# Patient Record
Sex: FEMALE | Race: WHITE | NOT HISPANIC OR LATINO | URBAN - METROPOLITAN AREA
[De-identification: names, ages, dates, MRNs, and addresses within clinical notes are randomized per-mention and may not be internally consistent; named-entity substitution may affect disease eponyms.]

---

## 2023-10-25 ENCOUNTER — APPOINTMENT (OUTPATIENT)
Dept: RADIOLOGY | Facility: HOSPITAL | Age: 63
End: 2023-10-25
Payer: COMMERCIAL

## 2023-10-27 ENCOUNTER — HOSPITAL ENCOUNTER (OUTPATIENT)
Dept: RADIOLOGY | Facility: HOSPITAL | Age: 63
Discharge: HOME | End: 2023-10-27
Payer: COMMERCIAL

## 2023-10-27 ENCOUNTER — ANCILLARY PROCEDURE (OUTPATIENT)
Dept: PREADMISSION TESTING | Facility: HOSPITAL | Age: 63
End: 2023-10-27
Payer: COMMERCIAL

## 2023-10-27 ENCOUNTER — LAB (OUTPATIENT)
Dept: LAB | Facility: LAB | Age: 63
End: 2023-10-27
Payer: COMMERCIAL

## 2023-10-27 VITALS
HEIGHT: 66 IN | SYSTOLIC BLOOD PRESSURE: 142 MMHG | OXYGEN SATURATION: 98 % | RESPIRATION RATE: 16 BRPM | WEIGHT: 144.62 LBS | BODY MASS INDEX: 23.24 KG/M2 | DIASTOLIC BLOOD PRESSURE: 89 MMHG | HEART RATE: 86 BPM | TEMPERATURE: 98.4 F

## 2023-10-27 DIAGNOSIS — M16.0 BILATERAL PRIMARY OSTEOARTHRITIS OF HIP: ICD-10-CM

## 2023-10-27 DIAGNOSIS — M16.0 PRIMARY OSTEOARTHRITIS OF BOTH HIPS: ICD-10-CM

## 2023-10-27 DIAGNOSIS — Z01.818 PRE-OP TESTING: Primary | ICD-10-CM

## 2023-10-27 DIAGNOSIS — Z01.818 ENCOUNTER FOR PREADMISSION TESTING: ICD-10-CM

## 2023-10-27 PROBLEM — M99.03 LUMBOSACRAL DYSFUNCTION: Status: ACTIVE | Noted: 2023-10-27

## 2023-10-27 PROBLEM — R31.9 HEMATURIA: Status: ACTIVE | Noted: 2023-10-27

## 2023-10-27 PROBLEM — M54.14 THORACIC RADICULITIS: Status: ACTIVE | Noted: 2023-10-27

## 2023-10-27 PROBLEM — G89.29 CHRONIC MUSCULOSKELETAL PAIN: Status: ACTIVE | Noted: 2023-10-27

## 2023-10-27 PROBLEM — M12.9 ARTHROPATHY: Status: ACTIVE | Noted: 2023-10-27

## 2023-10-27 PROBLEM — M70.62 TROCHANTERIC BURSITIS OF BOTH HIPS: Status: ACTIVE | Noted: 2023-10-27

## 2023-10-27 PROBLEM — R30.0 DYSURIA: Status: ACTIVE | Noted: 2023-10-27

## 2023-10-27 PROBLEM — R42 DIZZINESS, NONSPECIFIC: Status: ACTIVE | Noted: 2023-10-27

## 2023-10-27 PROBLEM — M15.9 OSTEOARTHRITIS, MULTIPLE SITES: Status: ACTIVE | Noted: 2023-10-27

## 2023-10-27 PROBLEM — M47.812 CERVICAL SPONDYLOSIS WITHOUT MYELOPATHY: Status: ACTIVE | Noted: 2023-10-27

## 2023-10-27 PROBLEM — R35.0 URINARY FREQUENCY: Status: ACTIVE | Noted: 2023-10-27

## 2023-10-27 PROBLEM — K29.50 GASTRITIS, CHRONIC: Status: ACTIVE | Noted: 2023-10-27

## 2023-10-27 PROBLEM — N39.46 MIXED INCONTINENCE URGE AND STRESS: Status: ACTIVE | Noted: 2023-10-27

## 2023-10-27 PROBLEM — M99.02 SEGMENTAL AND SOMATIC DYSFUNCTION OF THORACIC REGION: Status: ACTIVE | Noted: 2023-10-27

## 2023-10-27 PROBLEM — M99.01 CERVICOTHORACIC SOMATIC DYSFUNCTION: Status: ACTIVE | Noted: 2023-10-27

## 2023-10-27 PROBLEM — M54.81 BILATERAL OCCIPITAL NEURALGIA: Status: ACTIVE | Noted: 2023-10-27

## 2023-10-27 PROBLEM — M54.12 CERVICAL RADICULOPATHY AT C7: Status: ACTIVE | Noted: 2023-10-27

## 2023-10-27 PROBLEM — M70.61 TROCHANTERIC BURSITIS OF BOTH HIPS: Status: ACTIVE | Noted: 2023-10-27

## 2023-10-27 PROBLEM — M79.18 CHRONIC MUSCULOSKELETAL PAIN: Status: ACTIVE | Noted: 2023-10-27

## 2023-10-27 LAB
ALBUMIN SERPL BCP-MCNC: 4.3 G/DL (ref 3.4–5)
ANION GAP SERPL CALC-SCNC: 11 MMOL/L (ref 10–20)
BUN SERPL-MCNC: 9 MG/DL (ref 6–23)
CALCIUM SERPL-MCNC: 9.6 MG/DL (ref 8.6–10.3)
CHLORIDE SERPL-SCNC: 103 MMOL/L (ref 98–107)
CO2 SERPL-SCNC: 30 MMOL/L (ref 21–32)
CREAT SERPL-MCNC: 0.63 MG/DL (ref 0.5–1.05)
ERYTHROCYTE [DISTWIDTH] IN BLOOD BY AUTOMATED COUNT: 12.1 % (ref 11.5–14.5)
GFR SERPL CREATININE-BSD FRML MDRD: >90 ML/MIN/1.73M*2
GLUCOSE SERPL-MCNC: 75 MG/DL (ref 74–99)
HCT VFR BLD AUTO: 47.4 % (ref 36–46)
HGB BLD-MCNC: 15.1 G/DL (ref 12–16)
MCH RBC QN AUTO: 30.4 PG (ref 26–34)
MCHC RBC AUTO-ENTMCNC: 31.9 G/DL (ref 32–36)
MCV RBC AUTO: 96 FL (ref 80–100)
NRBC BLD-RTO: 0 /100 WBCS (ref 0–0)
PLATELET # BLD AUTO: 296 X10*3/UL (ref 150–450)
PMV BLD AUTO: 10 FL (ref 7.5–11.5)
POTASSIUM SERPL-SCNC: 4.5 MMOL/L (ref 3.5–5.3)
RBC # BLD AUTO: 4.96 X10*6/UL (ref 4–5.2)
SODIUM SERPL-SCNC: 139 MMOL/L (ref 136–145)
WBC # BLD AUTO: 6.3 X10*3/UL (ref 4.4–11.3)

## 2023-10-27 PROCEDURE — 87081 CULTURE SCREEN ONLY: CPT | Mod: STJLAB

## 2023-10-27 PROCEDURE — 73700 CT LOWER EXTREMITY W/O DYE: CPT | Mod: LT

## 2023-10-27 PROCEDURE — 93005 ELECTROCARDIOGRAM TRACING: CPT

## 2023-10-27 PROCEDURE — 85027 COMPLETE CBC AUTOMATED: CPT

## 2023-10-27 PROCEDURE — 36415 COLL VENOUS BLD VENIPUNCTURE: CPT

## 2023-10-27 PROCEDURE — 80048 BASIC METABOLIC PNL TOTAL CA: CPT

## 2023-10-27 PROCEDURE — 99202 OFFICE O/P NEW SF 15 MIN: CPT | Performed by: NURSE PRACTITIONER

## 2023-10-27 PROCEDURE — 82040 ASSAY OF SERUM ALBUMIN: CPT

## 2023-10-27 RX ORDER — CHLORHEXIDINE GLUCONATE ORAL RINSE 1.2 MG/ML
SOLUTION DENTAL
COMMUNITY
Start: 2023-05-04 | End: 2023-10-27 | Stop reason: ENTERED-IN-ERROR

## 2023-10-27 RX ORDER — METOPROLOL SUCCINATE 25 MG/1
1 TABLET, EXTENDED RELEASE ORAL DAILY
COMMUNITY
Start: 2021-09-28 | End: 2023-10-27 | Stop reason: ENTERED-IN-ERROR

## 2023-10-27 RX ORDER — CHOLECALCIFEROL (VITAMIN D3) 125 MCG
1 CAPSULE ORAL DAILY
COMMUNITY
End: 2023-10-27 | Stop reason: ENTERED-IN-ERROR

## 2023-10-27 RX ORDER — OMEPRAZOLE 20 MG/1
20 TABLET, DELAYED RELEASE ORAL 2 TIMES DAILY
COMMUNITY
Start: 2020-05-08 | End: 2023-10-27 | Stop reason: ENTERED-IN-ERROR

## 2023-10-27 RX ORDER — HYDROGEN PEROXIDE 3 %
SOLUTION, NON-ORAL MISCELLANEOUS
COMMUNITY
Start: 2020-09-09 | End: 2023-10-27 | Stop reason: ENTERED-IN-ERROR

## 2023-10-27 RX ORDER — CHOLECALCIFEROL (VITAMIN D3) 50 MCG
50 TABLET ORAL DAILY
COMMUNITY

## 2023-10-27 RX ORDER — DEXAMETHASONE 0.5 MG/5ML
ELIXIR ORAL
COMMUNITY
Start: 2023-10-21 | End: 2023-10-27 | Stop reason: ENTERED-IN-ERROR

## 2023-10-27 RX ORDER — ZINC GLUCONATE 50 MG
TABLET ORAL DAILY
COMMUNITY

## 2023-10-27 RX ORDER — MIDODRINE HYDROCHLORIDE 2.5 MG/1
1 TABLET ORAL 2 TIMES DAILY
COMMUNITY
Start: 2022-04-19 | End: 2023-10-27 | Stop reason: ENTERED-IN-ERROR

## 2023-10-27 RX ORDER — CHLORHEXIDINE GLUCONATE ORAL RINSE 1.2 MG/ML
SOLUTION DENTAL
Qty: 473 ML | Refills: 0 | Status: SHIPPED | OUTPATIENT
Start: 2023-10-27 | End: 2023-11-11 | Stop reason: HOSPADM

## 2023-10-27 RX ORDER — CLOBETASOL PROPIONATE 0.05 MG/G
GEL TOPICAL
COMMUNITY
Start: 2023-10-25 | End: 2023-10-27 | Stop reason: ENTERED-IN-ERROR

## 2023-10-27 RX ORDER — EFINACONAZOLE 100 MG/ML
SOLUTION TOPICAL
COMMUNITY
Start: 2023-07-20 | End: 2023-10-27 | Stop reason: ENTERED-IN-ERROR

## 2023-10-27 RX ORDER — MULTIVIT-MIN/IRON FUM/FOLIC AC 7.5 MG-4
1 TABLET ORAL DAILY
COMMUNITY

## 2023-10-27 RX ORDER — MAGNESIUM 200 MG
TABLET ORAL
COMMUNITY
Start: 2020-08-28 | End: 2023-10-27 | Stop reason: ENTERED-IN-ERROR

## 2023-10-27 ASSESSMENT — CHADS2 SCORE
HYPERTENSION: NO
AGE GREATER THAN OR EQUAL TO 75: NO
PRIOR STROKE OR TIA OR THROMBOEMBOLISM: NO
AGE GREATER THAN OR EQUAL TO 75: NO
CHADS2 SCORE: 0
CHF: NO
DIABETES: NO

## 2023-10-27 ASSESSMENT — DUKE ACTIVITY SCORE INDEX (DASI)
CAN YOU WALK A BLOCK OR TWO ON LEVEL GROUND: YES
CAN YOU DO HEAVY WORK AROUND THE HOUSE LIKE SCRUBBING FLOORS OR LIFTING AND MOVING HEAVY FURNITURE: NO
CAN YOU TAKE CARE OF YOURSELF (EAT, DRESS, BATHE, OR USE TOILET): YES
CAN YOU DO MODERATE WORK AROUND THE HOUSE LIKE VACUUMING, SWEEPING FLOORS OR CARRYING GROCERIES: YES
CAN YOU CLIMB A FLIGHT OF STAIRS OR WALK UP A HILL: YES
DASI METS SCORE: 7
CAN YOU DO LIGHT WORK AROUND THE HOUSE LIKE DUSTING OR WASHING DISHES: YES
CAN YOU DO YARD WORK LIKE RAKING LEAVES, WEEDING OR PUSHING A MOWER: NO
CAN YOU HAVE SEXUAL RELATIONS: NO
CAN YOU RUN A SHORT DISTANCE: YES
TOTAL_SCORE: 34.45
CAN YOU WALK INDOORS, SUCH AS AROUND YOUR HOUSE: YES
CAN YOU PARTICIPATE IN STRENOUS SPORTS LIKE SWIMMING, SINGLES TENNIS, FOOTBALL, BASKETBALL, OR SKIING: YES
CAN YOU PARTICIPATE IN MODERATE RECREATIONAL ACTIVITIES LIKE GOLF, BOWLING, DANCING, DOUBLES TENNIS OR THROWING A BASEBALL OR FOOTBALL: NO

## 2023-10-27 ASSESSMENT — LIFESTYLE VARIABLES: SMOKING_STATUS: NONSMOKER

## 2023-10-27 ASSESSMENT — ACTIVITIES OF DAILY LIVING (ADL): ADL_SCORE: 0

## 2023-10-27 NOTE — PREPROCEDURE INSTRUCTIONS
Medication List            Accurate as of October 27, 2023  9:34 AM. Always use your most recent med list.                cholecalciferol 50 MCG (2000 UT) tablet  Commonly known as: Vitamin D-3  Medication Adjustments for Surgery: Stop 7 days before surgery     multivitamin with minerals tablet  Medication Adjustments for Surgery: Stop 7 days before surgery     PROBIOTIC ORAL  Medication Adjustments for Surgery: Stop 7 days before surgery     zinc gluconate 50 mg tablet  Medication Adjustments for Surgery: Stop 7 days before surgery              PRE-OPERATIVE INSTRUCTIONS    You will receive notification one business day prior to your surgery to confirm your arrival time and additional information. It is important that you answer your phone and/or check your messages during this time.    Please enter the building through the Outpatient entrance. Take the elevator off the lobby to the 2nd floor and check in at the Outpatient Surgery desk    INSTRUCTIONS:  Talk to your surgeon for instructions if you should stop your aspirin, blood thinner, or diabetes medicines.  DO NOT take any multivitamins or over the counter supplements for 7-10 days before surgery.  If not being admitted, you must have an adult immediately available to drive you home after surgery. We also highly recommend you have someone stay with you for the entire day and night of your surgery.  For children having surgery, a parent or legal guardian must accompany t hem to the surgery center. If this is not possible, please call [phone] to make additional arrangements.  For adults who are unable to consent or make medical decisions for themselves, a legal guardian or Power of  must accompany them to the surgery center. If this is not possible, please call [phone] to make additional arrangements.  Wear comfortable, loose fitting clothing.  All jewelry and piercings must be removed. If you are unable to remove an item or have a dermal  piercing, please be sure to tell the nurse when you arrive for surgery.  Nail polish and make-up must be removed.  Avoid smoking or consuming alcohol for 24 hours before surgery.  To help prevent infection, please take a shower/bath and wash your hair the night before and/or morning of surgery.    Additional instructions about eating and drinking before surgery:  Do not eat any solid foods or drink anything but clear liquids within 6 hours of your arrival time for surgery. Milk, nutritional drinks/supplements, and infant formula are considered solid foods.  You may drink up to 12 oz. of clear liquids up to 2 hours before your arrival time for surgery, unless directed otherwise by your surgeon. Clear liquids include water, non-carbonated sports drinks (Gatorade), black tea or coffee (no creamers) and breast milk.    If you received a blue folder, please review additional information provided inside the folder regarding additional preparation.     If you have any questions or concerns, please call Pre-Admission Testing at [phone].

## 2023-10-27 NOTE — CPM/PAT H&P
"CPM/PAT Evaluation       Name: Rachel Westbrook (Rachel Westbrook)  /Age: 1960/63 y.o.     In-Person       Chief Complaint: left hip pains     HPI    VS is a 64 yo female who has been having left hip pains for quite some time and pain is worsening- \"left thigh is sore, but not excruciating\". Imaging shows left hip OA and subsequently she is scheduled for left hip replacement. Denies n/t down leg. Skin intact. Had a recent steroid injection in right shoulder about 3 months ago. Otherwise no recent illness, chest pains or shortness of breath.     Past Medical History:   Diagnosis Date    Arthritis     Mclaughlin's esophagus     DDD (degenerative disc disease), cervical     Fatty liver     Fibromyalgia, primary     GERD (gastroesophageal reflux disease)     follows with GI    Hyperlipidemia     Hypertension     Personal history of tuberculosis     History of tuberculosis    PONV (postoperative nausea and vomiting)      PCP: Dr. Melendez  GI: Dr. Bonilla    Past Surgical History:   Procedure Laterality Date    COLONOSCOPY      OOPHORECTOMY Left     UPPER GASTROINTESTINAL ENDOSCOPY         Patient  has no history on file for sexual activity.    Family History   Problem Relation Name Age of Onset    Other (alcoholic liver cirrhosis) Father      Other (alcoholic liver cirrhosis) Brother         No Known Allergies    Prior to Admission medications    Medication Sig Start Date End Date Taking? Authorizing Provider   BACILLUS COAGULANS-INULIN ORAL Take 1 capsule by mouth once daily.    Historical Provider, MD   chlorhexidine (Peridex) 0.12 % solution TAKE 15mL BY MOUTH TWICE A DAY.  SWISH AND SPIT  USE LIKE MOUTHWASH 23   Historical Provider, MD   cholecalciferol (Vitamin D-3) 125 MCG (5000 UT) capsule Take 1 capsule (5,000 Units) by mouth once daily.    Historical Provider, MD   clobetasol (Temovate) 0.05 % gel dry affected area with tissue paper  apply to affected area 2-4 times a day up to 5 days per week. avoid " eating or drinking for at least 30 10/25/23   Historical Provider, MD   cyanocobalamin, vitamin B-12, 1,000 mcg tablet, sublingual DISSOLVE ONE TABLET UNDER TONGUE EVERY DAY 8/28/20   Historical Provider, MD   dexAMETHasone 0.5 mg/5 mL elixir TAKE 5 ML BY MOUTH THREE TIMES A DAY AS NEEDED 10/21/23   Historical Provider, MD   diclofenac sodium 1 % kit Apply 2 (TWO) grams of gel TO THE hands(S) THREE TIMES DAILY.Do not apply more than EIGHT grams per day to any one joint. 10/30/20   Historical Provider, MD   esomeprazole (NexIUM) 20 mg DR capsule TAKE ONE CAPSULE BY MOUTH TWICE A DAY BEFORE MEALS 9/9/20   Historical Provider, MD   Jublia 10 % solution with applicator APPLY A THIN LAYER (0.5ML) TO AFFECTED TOENAILS AT BEDTIME  FILE DOWN WEEKLY. 7/20/23   Historical Provider, MD   Lactobacillus acidophilus (PROBIOTIC ORAL) Take 1 capsule by mouth once daily.    Historical Provider, MD   metoprolol succinate XL (Toprol-XL) 25 mg 24 hr tablet Take 1 tablet (25 mg) by mouth once daily. 9/28/21   Historical Provider, MD   midodrine (Proamatine) 2.5 mg tablet Take 1 tablet (2.5 mg) by mouth 2 times a day. 4/19/22   Historical Provider, MD   omeprazole OTC (PriLOSEC OTC) 20 mg EC tablet Take 1 tablet (20 mg) by mouth twice a day. 5/8/20   Historical Provider, MD UNA SCHWAB   Constitutional: Negative for fever, chills, or sweats   ENMT: Negative for nasal discharge, congestion, ear pain, mouth pain, throat pain eyeglasses  Respiratory: Negative for cough, wheezing, shortness of breath   Cardiac: Negative for chest pain, dyspnea on exertion, palpitations   Gastrointestinal: Negative for nausea, vomiting, diarrhea, constipation, abdominal pain  Genitourinary: Negative for dysuria, flank pain, frequency, hematuria   Musculoskeletal: Positive for decreased ROM, pain, weakness in left hip                                 And thigh  Neurological: Negative for dizziness, confusion, headache  Psychiatric: Negative for mood changes    Skin: Negative for itching, rash, ulcer    Hematologic/Lymph: Negative for bruising, easy bleeding  Allergic/Immunologic: itching, sneezing, swelling       Physical Exam  HENT:      Head: Normocephalic.      Mouth/Throat:      Mouth: Mucous membranes are moist.   Eyes:      Extraocular Movements: Extraocular movements intact.   Cardiovascular:      Rate and Rhythm: Normal rate and regular rhythm.   Pulmonary:      Effort: Pulmonary effort is normal.      Breath sounds: Normal breath sounds.   Abdominal:      General: Abdomen is flat.      Palpations: Abdomen is soft.   Musculoskeletal:         General: Tenderness present.      Cervical back: Normal range of motion.      Comments: Left hip limited ROM   Skin:     General: Skin is warm and dry.   Neurological:      General: No focal deficit present.      Mental Status: She is alert.   Psychiatric:         Mood and Affect: Mood normal.          PAT AIRWAY:   Airway:     Neck ROM::  Full  normal      Anesthesia:  Patient has history of PONV with anesthesia.    Visit Vitals  /89   Pulse 86   Temp 36.9 °C (98.4 °F) (Temporal)   Resp 16       DASI Risk Score      Flowsheet Row Most Recent Value   DASI SCORE 34.45   METS Score (Will be calculated only when all the questions are answered) 7          Caprini DVT Assessment      Flowsheet Row Most Recent Value   DVT Score 1   Current Status Major surgery planned, including arthroscopic and laproscopic (1-2 hours)   Age 60-75 years   BMI 30 or less          Modified Frailty Index      Flowsheet Row Most Recent Value   Modified Frailty Index Calculator 0          CHADS2 Stroke Risk  Current as of 2 minutes ago        N/A 3 - 100%: High Risk   2 - 3%: Medium Risk   0 - 2%: Low Risk     Last Change: N/A          This score determines the patient's risk of having a stroke if the patient has atrial fibrillation.        This score is not applicable to this patient. Components are not calculated.          Revised Cardiac Risk  Index      Flowsheet Row Most Recent Value   Revised Cardiac Risk Calculator 0          Apfel Simplified Score      Flowsheet Row Most Recent Value   Apfel Simplified Score Calculator 2          Risk Analysis Index Results This Encounter         10/27/2023  0920             ATKINSON Cancer History: Patient does not indicate history of cancer    Total Risk Analysis Index Score Without Cancer: 20    Total Risk Analysis Index Score: 20          Stop Bang Score      Flowsheet Row Most Recent Value   Do you snore loudly? 0   Do you often feel tired or fatigued after your sleep? 0   Has anyone ever observed you stop breathing in your sleep? 0   Do you have or are you being treated for high blood pressure? 0   Recent BMI (Calculated) 22.7   Is BMI greater than 35 kg/m2? 0=No   Age older than 50 years old? 1=Yes   Is your neck circumference greater than 17 inches (Male) or 16 inches (Female)? 0   Gender - Male 0=No   STOP-BANG Total Score 1            Assessment and Plan:     63-year-old female scheduled for Mamadou navigated arthroplasty-left hip on 11/10/2023 with Dr. Joe.  Blood work and MRSA ordered-oral rinse prescribed.  EKG shows normal sinus rhythm with ventricular rate of 81 bpm. She is instructed to notify surgeon office if any new skin changes occur to surgical limb. No further orders indicated. Of note, GI gave their clearance with instruction on aspirin for post op DVT prophylaxis-see note in media.     See risk scores as previously documented.

## 2023-10-28 LAB
ATRIAL RATE: 81 BPM
P AXIS: 59 DEGREES
P OFFSET: 216 MS
P ONSET: 169 MS
PR INTERVAL: 116 MS
Q ONSET: 227 MS
QRS COUNT: 13 BEATS
QRS DURATION: 78 MS
QT INTERVAL: 378 MS
QTC CALCULATION(BAZETT): 439 MS
QTC FREDERICIA: 417 MS
R AXIS: 9 DEGREES
T AXIS: 59 DEGREES
T OFFSET: 416 MS
VENTRICULAR RATE: 81 BPM

## 2023-10-29 LAB — STAPHYLOCOCCUS SPEC CULT: NORMAL

## 2023-11-10 ENCOUNTER — APPOINTMENT (OUTPATIENT)
Dept: RADIOLOGY | Facility: HOSPITAL | Age: 63
End: 2023-11-10
Payer: COMMERCIAL

## 2023-11-10 ENCOUNTER — HOSPITAL ENCOUNTER (OUTPATIENT)
Facility: HOSPITAL | Age: 63
Discharge: HOME HEALTH | End: 2023-11-11
Attending: STUDENT IN AN ORGANIZED HEALTH CARE EDUCATION/TRAINING PROGRAM | Admitting: STUDENT IN AN ORGANIZED HEALTH CARE EDUCATION/TRAINING PROGRAM
Payer: COMMERCIAL

## 2023-11-10 ENCOUNTER — ANESTHESIA (OUTPATIENT)
Dept: OPERATING ROOM | Facility: HOSPITAL | Age: 63
End: 2023-11-10
Payer: COMMERCIAL

## 2023-11-10 ENCOUNTER — PREP FOR PROCEDURE (OUTPATIENT)
Dept: ORTHOPEDICS | Facility: HOSPITAL | Age: 63
End: 2023-11-10

## 2023-11-10 ENCOUNTER — ANESTHESIA EVENT (OUTPATIENT)
Dept: OPERATING ROOM | Facility: HOSPITAL | Age: 63
End: 2023-11-10
Payer: COMMERCIAL

## 2023-11-10 DIAGNOSIS — Z96.642 S/P TOTAL LEFT HIP ARTHROPLASTY: Primary | ICD-10-CM

## 2023-11-10 PROBLEM — M16.9 HIP OSTEOARTHRITIS: Status: ACTIVE | Noted: 2023-11-10

## 2023-11-10 PROCEDURE — 7100000009 HC PHASE TWO TIME - INITIAL BASE CHARGE: Performed by: STUDENT IN AN ORGANIZED HEALTH CARE EDUCATION/TRAINING PROGRAM

## 2023-11-10 PROCEDURE — 2500000004 HC RX 250 GENERAL PHARMACY W/ HCPCS (ALT 636 FOR OP/ED): Performed by: STUDENT IN AN ORGANIZED HEALTH CARE EDUCATION/TRAINING PROGRAM

## 2023-11-10 PROCEDURE — 2500000004 HC RX 250 GENERAL PHARMACY W/ HCPCS (ALT 636 FOR OP/ED): Performed by: ANESTHESIOLOGIST ASSISTANT

## 2023-11-10 PROCEDURE — C1713 ANCHOR/SCREW BN/BN,TIS/BN: HCPCS | Performed by: STUDENT IN AN ORGANIZED HEALTH CARE EDUCATION/TRAINING PROGRAM

## 2023-11-10 PROCEDURE — 2500000005 HC RX 250 GENERAL PHARMACY W/O HCPCS: Performed by: ANESTHESIOLOGIST ASSISTANT

## 2023-11-10 PROCEDURE — 3700000002 HC GENERAL ANESTHESIA TIME - EACH INCREMENTAL 1 MINUTE: Performed by: STUDENT IN AN ORGANIZED HEALTH CARE EDUCATION/TRAINING PROGRAM

## 2023-11-10 PROCEDURE — 96372 THER/PROPH/DIAG INJ SC/IM: CPT | Performed by: STUDENT IN AN ORGANIZED HEALTH CARE EDUCATION/TRAINING PROGRAM

## 2023-11-10 PROCEDURE — A27130 PR TOTAL HIP ARTHROPLASTY: Performed by: ANESTHESIOLOGIST ASSISTANT

## 2023-11-10 PROCEDURE — 7100000001 HC RECOVERY ROOM TIME - INITIAL BASE CHARGE: Performed by: STUDENT IN AN ORGANIZED HEALTH CARE EDUCATION/TRAINING PROGRAM

## 2023-11-10 PROCEDURE — 3600000017 HC OR TIME - EACH INCREMENTAL 1 MINUTE - PROCEDURE LEVEL SIX: Performed by: STUDENT IN AN ORGANIZED HEALTH CARE EDUCATION/TRAINING PROGRAM

## 2023-11-10 PROCEDURE — 2500000004 HC RX 250 GENERAL PHARMACY W/ HCPCS (ALT 636 FOR OP/ED): Performed by: ANESTHESIOLOGY

## 2023-11-10 PROCEDURE — 2780000003 HC OR 278 NO HCPCS: Performed by: STUDENT IN AN ORGANIZED HEALTH CARE EDUCATION/TRAINING PROGRAM

## 2023-11-10 PROCEDURE — 2500000005 HC RX 250 GENERAL PHARMACY W/O HCPCS: Performed by: STUDENT IN AN ORGANIZED HEALTH CARE EDUCATION/TRAINING PROGRAM

## 2023-11-10 PROCEDURE — 7100000011 HC EXTENDED STAY RECOVERY HOURLY - NURSING UNIT

## 2023-11-10 PROCEDURE — 2720000007 HC OR 272 NO HCPCS: Performed by: STUDENT IN AN ORGANIZED HEALTH CARE EDUCATION/TRAINING PROGRAM

## 2023-11-10 PROCEDURE — 3600000018 HC OR TIME - INITIAL BASE CHARGE - PROCEDURE LEVEL SIX: Performed by: STUDENT IN AN ORGANIZED HEALTH CARE EDUCATION/TRAINING PROGRAM

## 2023-11-10 PROCEDURE — 73502 X-RAY EXAM HIP UNI 2-3 VIEWS: CPT | Mod: LT

## 2023-11-10 PROCEDURE — 7100000002 HC RECOVERY ROOM TIME - EACH INCREMENTAL 1 MINUTE: Performed by: STUDENT IN AN ORGANIZED HEALTH CARE EDUCATION/TRAINING PROGRAM

## 2023-11-10 PROCEDURE — 2500000001 HC RX 250 WO HCPCS SELF ADMINISTERED DRUGS (ALT 637 FOR MEDICARE OP): Performed by: ANESTHESIOLOGY

## 2023-11-10 PROCEDURE — A27130 PR TOTAL HIP ARTHROPLASTY: Performed by: ANESTHESIOLOGY

## 2023-11-10 PROCEDURE — 73502 X-RAY EXAM HIP UNI 2-3 VIEWS: CPT | Mod: LEFT SIDE | Performed by: RADIOLOGY

## 2023-11-10 PROCEDURE — 3700000001 HC GENERAL ANESTHESIA TIME - INITIAL BASE CHARGE: Performed by: STUDENT IN AN ORGANIZED HEALTH CARE EDUCATION/TRAINING PROGRAM

## 2023-11-10 PROCEDURE — 2500000004 HC RX 250 GENERAL PHARMACY W/ HCPCS (ALT 636 FOR OP/ED): Performed by: PHYSICIAN ASSISTANT

## 2023-11-10 PROCEDURE — 7100000010 HC PHASE TWO TIME - EACH INCREMENTAL 1 MINUTE: Performed by: STUDENT IN AN ORGANIZED HEALTH CARE EDUCATION/TRAINING PROGRAM

## 2023-11-10 PROCEDURE — C1776 JOINT DEVICE (IMPLANTABLE): HCPCS | Performed by: STUDENT IN AN ORGANIZED HEALTH CARE EDUCATION/TRAINING PROGRAM

## 2023-11-10 DEVICE — CERAMIC V40 FEMORAL HEAD
Type: IMPLANTABLE DEVICE | Site: HIP | Status: FUNCTIONAL
Brand: BIOLOX

## 2023-11-10 DEVICE — INSERT, TRIDENT X3 POLYETHYLENE, 0 DEG, 36MM D: Type: IMPLANTABLE DEVICE | Site: HIP | Status: FUNCTIONAL

## 2023-11-10 DEVICE — KNEE TIBIAL CHECKPOINT: Type: IMPLANTABLE DEVICE | Site: HIP | Status: NON-FUNCTIONAL

## 2023-11-10 DEVICE — TRIDENT II TRITANIUM CLUSTER 50D
Type: IMPLANTABLE DEVICE | Site: HIP | Status: FUNCTIONAL
Brand: TRIDENT II

## 2023-11-10 DEVICE — 6.5MM LOW PROFILE HEX SCREW 25MM
Type: IMPLANTABLE DEVICE | Site: HIP | Status: FUNCTIONAL
Brand: TRIDENT II

## 2023-11-10 RX ORDER — MORPHINE SULFATE 2 MG/ML
2 INJECTION, SOLUTION INTRAMUSCULAR; INTRAVENOUS EVERY 2 HOUR PRN
Status: DISCONTINUED | OUTPATIENT
Start: 2023-11-10 | End: 2023-11-11 | Stop reason: HOSPADM

## 2023-11-10 RX ORDER — ACETAMINOPHEN 500 MG
1000 TABLET ORAL 3 TIMES DAILY
Qty: 84 TABLET | Refills: 0 | Status: SHIPPED | OUTPATIENT
Start: 2023-11-10 | End: 2023-11-24

## 2023-11-10 RX ORDER — TRANEXAMIC ACID 10 MG/ML
1000 INJECTION, SOLUTION INTRAVENOUS ONCE
Status: DISCONTINUED | OUTPATIENT
Start: 2023-11-10 | End: 2023-11-10 | Stop reason: HOSPADM

## 2023-11-10 RX ORDER — ACETAMINOPHEN 325 MG/1
650 TABLET ORAL EVERY 6 HOURS
Status: DISCONTINUED | OUTPATIENT
Start: 2023-11-10 | End: 2023-11-11 | Stop reason: HOSPADM

## 2023-11-10 RX ORDER — OXYCODONE HYDROCHLORIDE 5 MG/1
5 TABLET ORAL 4 TIMES DAILY PRN
Qty: 28 TABLET | Refills: 0 | Status: SHIPPED | OUTPATIENT
Start: 2023-11-10 | End: 2023-11-17

## 2023-11-10 RX ORDER — TRANEXAMIC ACID 100 MG/ML
INJECTION, SOLUTION INTRAVENOUS AS NEEDED
Status: DISCONTINUED | OUTPATIENT
Start: 2023-11-10 | End: 2023-11-10

## 2023-11-10 RX ORDER — PHENYLEPHRINE HCL IN 0.9% NACL 1 MG/10 ML
SYRINGE (ML) INTRAVENOUS AS NEEDED
Status: DISCONTINUED | OUTPATIENT
Start: 2023-11-10 | End: 2023-11-10

## 2023-11-10 RX ORDER — TRANEXAMIC ACID 10 MG/ML
1000 INJECTION, SOLUTION INTRAVENOUS ONCE
Status: CANCELLED | OUTPATIENT
Start: 2023-11-10 | End: 2023-11-10

## 2023-11-10 RX ORDER — PHENYLEPHRINE 10 MG/250 ML(40 MCG/ML)IN 0.9 % SOD.CHLORIDE INTRAVENOUS
CONTINUOUS PRN
Status: DISCONTINUED | OUTPATIENT
Start: 2023-11-10 | End: 2023-11-10

## 2023-11-10 RX ORDER — ACETAMINOPHEN 325 MG/1
975 TABLET ORAL ONCE
Status: CANCELLED | OUTPATIENT
Start: 2023-11-10 | End: 2023-11-10

## 2023-11-10 RX ORDER — METOCLOPRAMIDE HYDROCHLORIDE 5 MG/ML
10 INJECTION INTRAMUSCULAR; INTRAVENOUS ONCE AS NEEDED
Status: DISCONTINUED | OUTPATIENT
Start: 2023-11-10 | End: 2023-11-10 | Stop reason: HOSPADM

## 2023-11-10 RX ORDER — CELECOXIB 200 MG/1
200 CAPSULE ORAL ONCE
Status: DISCONTINUED | OUTPATIENT
Start: 2023-11-10 | End: 2023-11-10 | Stop reason: HOSPADM

## 2023-11-10 RX ORDER — ALBUTEROL SULFATE 0.83 MG/ML
2.5 SOLUTION RESPIRATORY (INHALATION) ONCE AS NEEDED
Status: DISCONTINUED | OUTPATIENT
Start: 2023-11-10 | End: 2023-11-10 | Stop reason: HOSPADM

## 2023-11-10 RX ORDER — GLYCOPYRROLATE 0.2 MG/ML
INJECTION INTRAMUSCULAR; INTRAVENOUS AS NEEDED
Status: DISCONTINUED | OUTPATIENT
Start: 2023-11-10 | End: 2023-11-10

## 2023-11-10 RX ORDER — DEXAMETHASONE SODIUM PHOSPHATE 10 MG/ML
10 INJECTION INTRAMUSCULAR; INTRAVENOUS ONCE
Status: COMPLETED | OUTPATIENT
Start: 2023-11-10 | End: 2023-11-10

## 2023-11-10 RX ORDER — ONDANSETRON 4 MG/1
4 TABLET, ORALLY DISINTEGRATING ORAL EVERY 8 HOURS PRN
Qty: 6 TABLET | Refills: 0 | Status: SHIPPED | OUTPATIENT
Start: 2023-11-10 | End: 2023-11-13

## 2023-11-10 RX ORDER — ROCURONIUM BROMIDE 50 MG/5 ML
SYRINGE (ML) INTRAVENOUS AS NEEDED
Status: DISCONTINUED | OUTPATIENT
Start: 2023-11-10 | End: 2023-11-10

## 2023-11-10 RX ORDER — FAMOTIDINE 10 MG/ML
20 INJECTION INTRAVENOUS ONCE
Status: DISCONTINUED | OUTPATIENT
Start: 2023-11-10 | End: 2023-11-10 | Stop reason: HOSPADM

## 2023-11-10 RX ORDER — CEFAZOLIN SODIUM 2 G/100ML
INJECTION, SOLUTION INTRAVENOUS AS NEEDED
Status: DISCONTINUED | OUTPATIENT
Start: 2023-11-10 | End: 2023-11-10

## 2023-11-10 RX ORDER — ONDANSETRON HYDROCHLORIDE 2 MG/ML
INJECTION, SOLUTION INTRAVENOUS AS NEEDED
Status: DISCONTINUED | OUTPATIENT
Start: 2023-11-10 | End: 2023-11-10

## 2023-11-10 RX ORDER — CEPHALEXIN 500 MG/1
500 CAPSULE ORAL 3 TIMES DAILY
Qty: 21 CAPSULE | Refills: 0 | Status: SHIPPED | OUTPATIENT
Start: 2023-11-10 | End: 2023-11-17

## 2023-11-10 RX ORDER — CEFAZOLIN SODIUM 2 G/100ML
2 INJECTION, SOLUTION INTRAVENOUS EVERY 8 HOURS
Status: DISPENSED | OUTPATIENT
Start: 2023-11-10 | End: 2023-11-11

## 2023-11-10 RX ORDER — FENTANYL CITRATE 50 UG/ML
INJECTION, SOLUTION INTRAMUSCULAR; INTRAVENOUS AS NEEDED
Status: DISCONTINUED | OUTPATIENT
Start: 2023-11-10 | End: 2023-11-10

## 2023-11-10 RX ORDER — KETOROLAC TROMETHAMINE 30 MG/ML
INJECTION, SOLUTION INTRAMUSCULAR; INTRAVENOUS AS NEEDED
Status: DISCONTINUED | OUTPATIENT
Start: 2023-11-10 | End: 2023-11-10 | Stop reason: HOSPADM

## 2023-11-10 RX ORDER — LIDOCAINE HYDROCHLORIDE 10 MG/ML
0.1 INJECTION, SOLUTION EPIDURAL; INFILTRATION; INTRACAUDAL; PERINEURAL ONCE
Status: DISCONTINUED | OUTPATIENT
Start: 2023-11-10 | End: 2023-11-10 | Stop reason: HOSPADM

## 2023-11-10 RX ORDER — HYDROMORPHONE HYDROCHLORIDE 1 MG/ML
0.2 INJECTION, SOLUTION INTRAMUSCULAR; INTRAVENOUS; SUBCUTANEOUS EVERY 5 MIN PRN
Status: DISCONTINUED | OUTPATIENT
Start: 2023-11-10 | End: 2023-11-10

## 2023-11-10 RX ORDER — HYDROMORPHONE HYDROCHLORIDE 1 MG/ML
0.5 INJECTION, SOLUTION INTRAMUSCULAR; INTRAVENOUS; SUBCUTANEOUS EVERY 5 MIN PRN
Status: DISCONTINUED | OUTPATIENT
Start: 2023-11-10 | End: 2023-11-10

## 2023-11-10 RX ORDER — DOCUSATE SODIUM 100 MG/1
100 CAPSULE, LIQUID FILLED ORAL 2 TIMES DAILY
Status: DISCONTINUED | OUTPATIENT
Start: 2023-11-10 | End: 2023-11-11 | Stop reason: HOSPADM

## 2023-11-10 RX ORDER — DIPHENHYDRAMINE HYDROCHLORIDE 50 MG/ML
12.5 INJECTION INTRAMUSCULAR; INTRAVENOUS ONCE AS NEEDED
Status: DISCONTINUED | OUTPATIENT
Start: 2023-11-10 | End: 2023-11-10

## 2023-11-10 RX ORDER — SODIUM CHLORIDE, SODIUM LACTATE, POTASSIUM CHLORIDE, CALCIUM CHLORIDE 600; 310; 30; 20 MG/100ML; MG/100ML; MG/100ML; MG/100ML
100 INJECTION, SOLUTION INTRAVENOUS CONTINUOUS
Status: DISCONTINUED | OUTPATIENT
Start: 2023-11-10 | End: 2023-11-11 | Stop reason: HOSPADM

## 2023-11-10 RX ORDER — CELECOXIB 200 MG/1
200 CAPSULE ORAL ONCE
Status: CANCELLED | OUTPATIENT
Start: 2023-11-10 | End: 2023-11-10

## 2023-11-10 RX ORDER — ASPIRIN 81 MG/1
81 TABLET ORAL 2 TIMES DAILY
Qty: 70 TABLET | Refills: 0 | Status: SHIPPED | OUTPATIENT
Start: 2023-11-10 | End: 2023-12-15

## 2023-11-10 RX ORDER — LABETALOL HYDROCHLORIDE 5 MG/ML
5 INJECTION, SOLUTION INTRAVENOUS ONCE AS NEEDED
Status: DISCONTINUED | OUTPATIENT
Start: 2023-11-10 | End: 2023-11-10 | Stop reason: HOSPADM

## 2023-11-10 RX ORDER — ACETAMINOPHEN 325 MG/1
975 TABLET ORAL ONCE
Status: COMPLETED | OUTPATIENT
Start: 2023-11-10 | End: 2023-11-10

## 2023-11-10 RX ORDER — ONDANSETRON HYDROCHLORIDE 2 MG/ML
4 INJECTION, SOLUTION INTRAVENOUS ONCE AS NEEDED
Status: COMPLETED | OUTPATIENT
Start: 2023-11-10 | End: 2023-11-10

## 2023-11-10 RX ORDER — POLYETHYLENE GLYCOL 3350 17 G/17G
17 POWDER, FOR SOLUTION ORAL DAILY
Status: DISCONTINUED | OUTPATIENT
Start: 2023-11-10 | End: 2023-11-11 | Stop reason: HOSPADM

## 2023-11-10 RX ORDER — MIDAZOLAM HYDROCHLORIDE 1 MG/ML
INJECTION, SOLUTION INTRAMUSCULAR; INTRAVENOUS AS NEEDED
Status: DISCONTINUED | OUTPATIENT
Start: 2023-11-10 | End: 2023-11-10

## 2023-11-10 RX ORDER — ACETAMINOPHEN 325 MG/1
650 TABLET ORAL EVERY 6 HOURS SCHEDULED
Status: DISCONTINUED | OUTPATIENT
Start: 2023-11-10 | End: 2023-11-10

## 2023-11-10 RX ORDER — CYCLOBENZAPRINE HCL 10 MG
10 TABLET ORAL 3 TIMES DAILY PRN
Status: DISCONTINUED | OUTPATIENT
Start: 2023-11-10 | End: 2023-11-11 | Stop reason: HOSPADM

## 2023-11-10 RX ORDER — PROPOFOL 10 MG/ML
INJECTION, EMULSION INTRAVENOUS AS NEEDED
Status: DISCONTINUED | OUTPATIENT
Start: 2023-11-10 | End: 2023-11-10

## 2023-11-10 RX ORDER — OXYCODONE HYDROCHLORIDE 10 MG/1
10 TABLET ORAL EVERY 4 HOURS PRN
Status: DISCONTINUED | OUTPATIENT
Start: 2023-11-10 | End: 2023-11-11 | Stop reason: HOSPADM

## 2023-11-10 RX ORDER — ACETAMINOPHEN 325 MG/1
975 TABLET ORAL ONCE
Status: DISCONTINUED | OUTPATIENT
Start: 2023-11-10 | End: 2023-11-10 | Stop reason: HOSPADM

## 2023-11-10 RX ORDER — SODIUM CHLORIDE, SODIUM LACTATE, POTASSIUM CHLORIDE, CALCIUM CHLORIDE 600; 310; 30; 20 MG/100ML; MG/100ML; MG/100ML; MG/100ML
100 INJECTION, SOLUTION INTRAVENOUS CONTINUOUS
Status: CANCELLED | OUTPATIENT
Start: 2023-11-10

## 2023-11-10 RX ORDER — MEPERIDINE HYDROCHLORIDE 50 MG/ML
12.5 INJECTION INTRAMUSCULAR; INTRAVENOUS; SUBCUTANEOUS EVERY 10 MIN PRN
Status: DISCONTINUED | OUTPATIENT
Start: 2023-11-10 | End: 2023-11-10 | Stop reason: HOSPADM

## 2023-11-10 RX ORDER — OXYCODONE HYDROCHLORIDE 5 MG/1
5 TABLET ORAL EVERY 4 HOURS PRN
Status: DISCONTINUED | OUTPATIENT
Start: 2023-11-10 | End: 2023-11-10

## 2023-11-10 RX ORDER — OXYCODONE HYDROCHLORIDE 10 MG/1
10 TABLET ORAL EVERY 4 HOURS PRN
Status: DISCONTINUED | OUTPATIENT
Start: 2023-11-10 | End: 2023-11-10

## 2023-11-10 RX ORDER — LIDOCAINE HYDROCHLORIDE 20 MG/ML
INJECTION, SOLUTION EPIDURAL; INFILTRATION; INTRACAUDAL; PERINEURAL AS NEEDED
Status: DISCONTINUED | OUTPATIENT
Start: 2023-11-10 | End: 2023-11-10

## 2023-11-10 RX ORDER — DOCUSATE SODIUM 100 MG/1
100 CAPSULE, LIQUID FILLED ORAL 2 TIMES DAILY
Qty: 14 CAPSULE | Refills: 0 | Status: SHIPPED | OUTPATIENT
Start: 2023-11-10 | End: 2023-11-17

## 2023-11-10 RX ORDER — ASPIRIN 81 MG/1
81 TABLET ORAL 2 TIMES DAILY
Status: DISCONTINUED | OUTPATIENT
Start: 2023-11-10 | End: 2023-11-11 | Stop reason: HOSPADM

## 2023-11-10 RX ORDER — OXYCODONE HYDROCHLORIDE 5 MG/1
5 TABLET ORAL EVERY 6 HOURS PRN
Status: DISCONTINUED | OUTPATIENT
Start: 2023-11-10 | End: 2023-11-11 | Stop reason: HOSPADM

## 2023-11-10 RX ORDER — BUPIVACAINE HYDROCHLORIDE 5 MG/ML
INJECTION, SOLUTION PERINEURAL AS NEEDED
Status: DISCONTINUED | OUTPATIENT
Start: 2023-11-10 | End: 2023-11-10 | Stop reason: HOSPADM

## 2023-11-10 RX ORDER — SODIUM CHLORIDE, SODIUM LACTATE, POTASSIUM CHLORIDE, CALCIUM CHLORIDE 600; 310; 30; 20 MG/100ML; MG/100ML; MG/100ML; MG/100ML
100 INJECTION, SOLUTION INTRAVENOUS CONTINUOUS
Status: DISCONTINUED | OUTPATIENT
Start: 2023-11-10 | End: 2023-11-10 | Stop reason: HOSPADM

## 2023-11-10 RX ORDER — DEXAMETHASONE SODIUM PHOSPHATE 10 MG/ML
INJECTION INTRAMUSCULAR; INTRAVENOUS AS NEEDED
Status: DISCONTINUED | OUTPATIENT
Start: 2023-11-10 | End: 2023-11-10

## 2023-11-10 RX ORDER — ONDANSETRON HYDROCHLORIDE 2 MG/ML
4 INJECTION, SOLUTION INTRAVENOUS EVERY 6 HOURS PRN
Status: DISCONTINUED | OUTPATIENT
Start: 2023-11-10 | End: 2023-11-11 | Stop reason: HOSPADM

## 2023-11-10 RX ORDER — OXYCODONE HYDROCHLORIDE 5 MG/1
5 TABLET ORAL EVERY 4 HOURS PRN
Status: DISCONTINUED | OUTPATIENT
Start: 2023-11-10 | End: 2023-11-10 | Stop reason: HOSPADM

## 2023-11-10 RX ORDER — HYDRALAZINE HYDROCHLORIDE 20 MG/ML
5 INJECTION INTRAMUSCULAR; INTRAVENOUS EVERY 30 MIN PRN
Status: DISCONTINUED | OUTPATIENT
Start: 2023-11-10 | End: 2023-11-10 | Stop reason: HOSPADM

## 2023-11-10 RX ADMIN — ACETAMINOPHEN 975 MG: 325 TABLET ORAL at 08:03

## 2023-11-10 RX ADMIN — SODIUM CHLORIDE, POTASSIUM CHLORIDE, SODIUM LACTATE AND CALCIUM CHLORIDE 100 ML/HR: 600; 310; 30; 20 INJECTION, SOLUTION INTRAVENOUS at 08:07

## 2023-11-10 RX ADMIN — DEXAMETHASONE SODIUM PHOSPHATE 10 MG: 10 INJECTION, SOLUTION INTRAMUSCULAR; INTRAVENOUS at 13:09

## 2023-11-10 RX ADMIN — ACETAMINOPHEN 650 MG: 325 TABLET ORAL at 18:21

## 2023-11-10 RX ADMIN — CEFAZOLIN SODIUM 2 G: 2 INJECTION, SOLUTION INTRAVENOUS at 18:22

## 2023-11-10 RX ADMIN — ONDANSETRON 4 MG: 2 INJECTION INTRAMUSCULAR; INTRAVENOUS at 18:22

## 2023-11-10 RX ADMIN — LIDOCAINE HYDROCHLORIDE 60 MG: 20 INJECTION, SOLUTION EPIDURAL; INFILTRATION; INTRACAUDAL; PERINEURAL at 10:59

## 2023-11-10 RX ADMIN — PROPOFOL 50 MG: 10 INJECTION, EMULSION INTRAVENOUS at 12:32

## 2023-11-10 RX ADMIN — FENTANYL CITRATE 25 MCG: 50 INJECTION, SOLUTION INTRAMUSCULAR; INTRAVENOUS at 12:36

## 2023-11-10 RX ADMIN — ONDANSETRON 4 MG: 2 INJECTION INTRAMUSCULAR; INTRAVENOUS at 13:16

## 2023-11-10 RX ADMIN — OXYCODONE HYDROCHLORIDE 5 MG: 5 TABLET ORAL at 14:33

## 2023-11-10 RX ADMIN — TRANEXAMIC ACID 1000 MG: 1 INJECTION, SOLUTION INTRAVENOUS at 11:11

## 2023-11-10 RX ADMIN — ONDANSETRON 4 MG: 2 INJECTION INTRAMUSCULAR; INTRAVENOUS at 12:43

## 2023-11-10 RX ADMIN — FENTANYL CITRATE 50 MCG: 50 INJECTION, SOLUTION INTRAMUSCULAR; INTRAVENOUS at 11:28

## 2023-11-10 RX ADMIN — PROPOFOL 130 MG: 10 INJECTION, EMULSION INTRAVENOUS at 10:59

## 2023-11-10 RX ADMIN — PROPOFOL 20 MG: 10 INJECTION, EMULSION INTRAVENOUS at 11:06

## 2023-11-10 RX ADMIN — Medication 300 MCG: at 11:37

## 2023-11-10 RX ADMIN — FENTANYL CITRATE 25 MCG: 50 INJECTION, SOLUTION INTRAMUSCULAR; INTRAVENOUS at 10:59

## 2023-11-10 RX ADMIN — MORPHINE SULFATE 2 MG: 2 INJECTION, SOLUTION INTRAMUSCULAR; INTRAVENOUS at 13:24

## 2023-11-10 RX ADMIN — Medication 0.8 MCG/KG/MIN: at 11:37

## 2023-11-10 RX ADMIN — MIDAZOLAM 2 MG: 1 INJECTION INTRAMUSCULAR; INTRAVENOUS at 10:56

## 2023-11-10 RX ADMIN — SUGAMMADEX 200 MG: 100 INJECTION, SOLUTION INTRAVENOUS at 12:49

## 2023-11-10 RX ADMIN — GLYCOPYRROLATE 0.2 MG: 0.2 INJECTION, SOLUTION INTRAMUSCULAR; INTRAVENOUS at 11:36

## 2023-11-10 RX ADMIN — ONDANSETRON 4 MG: 2 INJECTION INTRAMUSCULAR; INTRAVENOUS at 12:26

## 2023-11-10 RX ADMIN — Medication 10 MG: at 12:20

## 2023-11-10 RX ADMIN — DEXAMETHASONE SODIUM PHOSPHATE 10 MG: 10 INJECTION, SOLUTION INTRAMUSCULAR; INTRAVENOUS at 11:11

## 2023-11-10 RX ADMIN — TRANEXAMIC ACID 1000 MG: 1 INJECTION, SOLUTION INTRAVENOUS at 12:28

## 2023-11-10 RX ADMIN — Medication 50 MG: at 10:59

## 2023-11-10 RX ADMIN — ONDANSETRON 4 MG: 2 INJECTION INTRAMUSCULAR; INTRAVENOUS at 23:08

## 2023-11-10 RX ADMIN — CEFAZOLIN SODIUM 2 G: 2 INJECTION, SOLUTION INTRAVENOUS at 11:11

## 2023-11-10 SDOH — SOCIAL STABILITY: SOCIAL INSECURITY: WERE YOU ABLE TO COMPLETE ALL THE BEHAVIORAL HEALTH SCREENINGS?: YES

## 2023-11-10 SDOH — SOCIAL STABILITY: SOCIAL INSECURITY: ARE YOU OR HAVE YOU BEEN THREATENED OR ABUSED PHYSICALLY, EMOTIONALLY, OR SEXUALLY BY ANYONE?: NO

## 2023-11-10 SDOH — SOCIAL STABILITY: SOCIAL INSECURITY: HAVE YOU HAD THOUGHTS OF HARMING ANYONE ELSE?: NO

## 2023-11-10 SDOH — SOCIAL STABILITY: SOCIAL INSECURITY: DO YOU FEEL UNSAFE GOING BACK TO THE PLACE WHERE YOU ARE LIVING?: NO

## 2023-11-10 SDOH — SOCIAL STABILITY: SOCIAL INSECURITY: DOES ANYONE TRY TO KEEP YOU FROM HAVING/CONTACTING OTHER FRIENDS OR DOING THINGS OUTSIDE YOUR HOME?: NO

## 2023-11-10 SDOH — SOCIAL STABILITY: SOCIAL INSECURITY: HAS ANYONE EVER THREATENED TO HURT YOUR FAMILY OR YOUR PETS?: NO

## 2023-11-10 SDOH — SOCIAL STABILITY: SOCIAL INSECURITY: DO YOU FEEL ANYONE HAS EXPLOITED OR TAKEN ADVANTAGE OF YOU FINANCIALLY OR OF YOUR PERSONAL PROPERTY?: NO

## 2023-11-10 SDOH — SOCIAL STABILITY: SOCIAL INSECURITY: ARE THERE ANY APPARENT SIGNS OF INJURIES/BEHAVIORS THAT COULD BE RELATED TO ABUSE/NEGLECT?: NO

## 2023-11-10 SDOH — SOCIAL STABILITY: SOCIAL INSECURITY: ABUSE: ADULT

## 2023-11-10 ASSESSMENT — PAIN - FUNCTIONAL ASSESSMENT
PAIN_FUNCTIONAL_ASSESSMENT: 0-10

## 2023-11-10 ASSESSMENT — COGNITIVE AND FUNCTIONAL STATUS - GENERAL
PATIENT BASELINE BEDBOUND: NO
MOBILITY SCORE: 24
DAILY ACTIVITIY SCORE: 24

## 2023-11-10 ASSESSMENT — PAIN SCALES - GENERAL
PAINLEVEL_OUTOF10: 5 - MODERATE PAIN
PAINLEVEL_OUTOF10: 6
PAIN_LEVEL: 3
PAINLEVEL_OUTOF10: 6
PAINLEVEL_OUTOF10: 5 - MODERATE PAIN
PAINLEVEL_OUTOF10: 4
PAINLEVEL_OUTOF10: 4
PAINLEVEL_OUTOF10: 6
PAINLEVEL_OUTOF10: 0 - NO PAIN
PAINLEVEL_OUTOF10: 0 - NO PAIN

## 2023-11-10 ASSESSMENT — ACTIVITIES OF DAILY LIVING (ADL)
JUDGMENT_ADEQUATE_SAFELY_COMPLETE_DAILY_ACTIVITIES: NO
PATIENT'S MEMORY ADEQUATE TO SAFELY COMPLETE DAILY ACTIVITIES?: NO
DRESSING YOURSELF: INDEPENDENT
TOILETING: INDEPENDENT
LACK_OF_TRANSPORTATION: NO
ADEQUATE_TO_COMPLETE_ADL: NO
HEARING - RIGHT EAR: FUNCTIONAL
BATHING: INDEPENDENT
HEARING - LEFT EAR: FUNCTIONAL
FEEDING YOURSELF: INDEPENDENT
WALKS IN HOME: INDEPENDENT
GROOMING: INDEPENDENT

## 2023-11-10 ASSESSMENT — LIFESTYLE VARIABLES
HOW OFTEN DO YOU HAVE 6 OR MORE DRINKS ON ONE OCCASION: NEVER
AUDIT-C TOTAL SCORE: 0
SKIP TO QUESTIONS 9-10: 1
AUDIT-C TOTAL SCORE: 0
HOW MANY STANDARD DRINKS CONTAINING ALCOHOL DO YOU HAVE ON A TYPICAL DAY: PATIENT DOES NOT DRINK
PRESCIPTION_ABUSE_PAST_12_MONTHS: NO
SUBSTANCE_ABUSE_PAST_12_MONTHS: NO
HOW OFTEN DO YOU HAVE A DRINK CONTAINING ALCOHOL: NEVER

## 2023-11-10 ASSESSMENT — COLUMBIA-SUICIDE SEVERITY RATING SCALE - C-SSRS
1. IN THE PAST MONTH, HAVE YOU WISHED YOU WERE DEAD OR WISHED YOU COULD GO TO SLEEP AND NOT WAKE UP?: NO
2. HAVE YOU ACTUALLY HAD ANY THOUGHTS OF KILLING YOURSELF?: NO
6. HAVE YOU EVER DONE ANYTHING, STARTED TO DO ANYTHING, OR PREPARED TO DO ANYTHING TO END YOUR LIFE?: NO

## 2023-11-10 ASSESSMENT — PATIENT HEALTH QUESTIONNAIRE - PHQ9
1. LITTLE INTEREST OR PLEASURE IN DOING THINGS: NOT AT ALL
2. FEELING DOWN, DEPRESSED OR HOPELESS: NOT AT ALL
SUM OF ALL RESPONSES TO PHQ9 QUESTIONS 1 & 2: 0

## 2023-11-10 ASSESSMENT — PAIN DESCRIPTION - DESCRIPTORS
DESCRIPTORS: SHARP;SORE;THROBBING
DESCRIPTORS: ACHING

## 2023-11-10 NOTE — ANESTHESIA POSTPROCEDURE EVALUATION
Patient: Rachel Westbrook    Procedure Summary       Date: 11/10/23 Room / Location: [facility] OR 02 / [facility]    Anesthesia Start: 1056 Anesthesia Stop: 1306    Procedure: left anterior total hip replacement with Mamadou Robot (Left: Hip) Diagnosis:       Bilateral primary osteoarthritis of hip      (M16.0 - Bilateral primary osteoarthritis of hip)    Surgeons: Lorenzo Joe MD Responsible Provider: Mario Sim DO    Anesthesia Type: general ASA Status: 2            Anesthesia Type: general    Vitals Value Taken Time   BP 93/55 11/10/23 1345   Temp 36 °C (96.8 °F) 11/10/23 1300   Pulse 60 11/10/23 1350   Resp 18 11/10/23 1350   SpO2 100 % 11/10/23 1350   Vitals shown include unvalidated device data.    Anesthesia Post Evaluation    Patient location during evaluation: PACU  Patient participation: complete - patient cannot participate  Level of consciousness: sleepy but conscious  Pain score: 3  Pain management: adequate  Airway patency: patent  Two or more strategies used to mitigate risk of obstructive sleep apnea  Cardiovascular status: acceptable and stable  Respiratory status: acceptable and face mask  Hydration status: acceptable        No notable events documented.

## 2023-11-10 NOTE — PROGRESS NOTES
Physical Therapy                 Therapy Communication Note    Patient Name: Rachel Westbrook  MRN: [id number]  Today's Date: 11/10/2023     Discipline: Physical Therapy    Missed Time: Attempt    Comment: Order received.  Chart reviewed.  Attempted to complete a PT eval. on this L THR pt. of Dr. Joe.  She was initially willing to go through with it, but when she sat up began to c/o nausea and dizziness.   RN (Wicho) made aware.  Assisted back into supine.  BP sqv343/87.   She appeared pale.  She agreed she was not ready to stand and walk at this time.  Wants to stay tonight.  Will be seen in a.m..     NOTE: son lives with her in a 2 story house. Stays on 1st floor with full bath and bedroom.   3 elly with no rail.  Has a walker.

## 2023-11-10 NOTE — ANESTHESIA PREPROCEDURE EVALUATION
Patient: Rachel Westbrook    Procedure Information       Date/Time: 11/10/23 1035    Procedure: left anterior total hip replacement with Mamadou Robot (Left: Hip)    Location: [facility] OR 02 / [facility]    Surgeons: Lorenzo Joe MD            Relevant Problems   Neuro/Psych   (+) Bilateral occipital neuralgia   (+) Cervical radiculopathy at C7   (+) Thoracic radiculitis      Musculoskeletal   (+) Cervical spondylosis without myelopathy   (+) Osteoarthritis, multiple sites       Clinical information reviewed:   Tobacco  Allergies  Meds   Med Hx  Surg Hx  OB Status  Fam Hx  Soc   Hx        NPO Detail:  NPO/Void Status  Carbonhydrate Drink Given Prior to Surgery? : N  Date of Last Liquid: 11/10/23  Time of Last Liquid: 0600 (water)  Date of Last Solid: 11/09/23  Time of Last Solid: 1800  Last Intake Type: Clear fluids  Time of Last Void: 0600         Physical Exam    Airway  Mallampati: II  TM distance: >3 FB     Cardiovascular   Rhythm: regular  Rate: normal     Dental - normal exam     Pulmonary - normal exam     Abdominal        Anesthesia Plan    ASA 2     general     intravenous induction   Postoperative administration of opioids is intended.  Anesthetic plan and risks discussed with patient.    Plan discussed with CAA.

## 2023-11-10 NOTE — OP NOTE
left anterior total hip replacement with Marcos Robot (L) Operative Note     Date: 11/10/2023  OR Location: [facility] OR    Name: Rachel Westbrook : 1960, Age: 63 y.o., MRN: [id number], Sex: female    Diagnosis  Pre-op Diagnosis     * Bilateral primary osteoarthritis of hip [M16.0] Post-op Diagnosis     * Bilateral primary osteoarthritis of hip [M16.0]     Procedures  Left anterior total hip replacement with marcos robotic assistance  Surgeons      * Lorenzo Joe - Primary    Procedure Summary  Anesthesia: General  ASA: II  Anesthesia Staff: Anesthesiologist: Mario Sim DO  C-AA: ROBYN Montes De Oca  Estimated Blood Loss: 150mL  Intra-op Medications:   Medication Name Total Dose   BUPivacaine HCl (Marcaine) 0.5 % (5 mg/mL) injection 30 mL   ketorolac (Toradol) injection 30 mg              Anesthesia Record               Intraprocedure I/O Totals          Intake    Propofol Drip 0.00 mL    The total shown is the total volume documented since Anesthesia Start was filed.    Phenylephrine Drip 0.00 mL    The total shown is the total volume documented since Anesthesia Start was filed.    Total Intake 0 mL          Specimen: No specimens collected     Staff:   Circulator: Leidy Magaña RN; Lilia Pan RN; Lupe Horn RN  Scrub Person: Leidy Sherwood; Elaine Villatoro; Akua Larkin      Tourniquet Times: NA         Implants:  Implants       Type Name Action Serial No.       MARCOS KNEE TIBIAL CHECKPOINT, IMAGING LESION LOCALIZATION MARKER, IMPLANTABLE Implanted      Joint SHELL, TRIDENT II, CLUSTERHOLE, 50D - [id number] Implanted      Screw SCREW, LOW PROFILE HEX, 6.5 X 25 MM - [id number] Implanted      Joint INSERT, TRIDENT X3 POLYETHYLENE, 0 DEG, 36MM D - [id number] Implanted      Joint HEAD, FEMUR V40 36MM +2.5MM BIOLOX DELTA - [id number] Implanted       SIZE 5, INSIGNIA HIP STEM, HIGH OFFSET Implanted               SURGEON: Lorenzo Joe M.D.    ASSISTANT: Elaine Gray    No  qualified resident was available to assist during the case.  The assistant was needed for helping position the patient, holding retractors, and assisting with wound closure.    PREOPERATIVE DIAGNOSIS:  Advanced degenerative joint disease secondary to primary osteoarthritis of the left Hip    POSTOPERATIVE DIAGNOSIS: same    PROCEDURE: Left Anterior Total Hip Arthroplasty with Mamadou Robotic assistance    IMPLANTS:  Implant : Raven  Acetabular component: 50 mm   Acetabular screws: 25 mm x1   Acetabular liner: 0 degree x3 poly with ID 36 mm  Femoral component: insignia high offset size 5  Femoral head: 36 mm +2.5 mm ceramic    SURGICAL DETAILS:  1) Incision type: Modified Hueter  2) Incision length:  approximately 12 cm  3) Muscle repair:  None  4) Case duration: 1 hour 11 min  5) Estimated blood loss: 150 cc  6) Crystalloid replacement: IV crystalloid  7) Anesthesia type: general with periarticular injection  8) Capsular injection: Bupivacaine with Ketorolac  9) Specimens removed: Femoral head and acetabular reamings consistent with DJD, therefore not sent for pathologic review  10) Preoperative antibiotics:  weightbased cefazolin  11) TXA:  1g IV at start of the case, 1g IV at closure    INDICATIONS FOR PROCEDURE:  This patient presents for total hip arthroplasty for advanced degenerative joint disease of the hip. The patient has failed non-operative treatment. Risks, complications, and benefits of the procedure have been discussed preoperatively to include but not limited to infection, bleeding, anesthesia risks, sciatic nerve palsy, femoral nerve palsy, thigh numbness, instability, limb length discrepancy, aseptic loosening, osteolysis, DVT, continued pain, iatrogenic fracture, MI, stroke, and death.  The patient completed preoperative medical clearance and joint arthroplasty education.     PROCEDURE:  The patient was seen in the preoperative holding area.  The operative site was confirmed and marked.   SCD was placed on the nonoperative leg.  The risks, benefits, and alternatives to surgery were again confirmed with the patient and they wished to proceed.  The patient was given iodine swabs for the nares for MRSA decolonization.    The patient was brought to the operating room and placed on the operating room table in the supine position. After anesthesia was established, the bilateral ankles were padded with ABD pads, soft roll, and Coban.  The feet were placed in the traction boots.  The perineal post pad was placed.  All bony prominences were otherwise padded. An EKG lead was placed and secured to the area overlying the patella to aid in referencing leg lengths using the Mamadou technology.     A surgical time out was taken to confirm the operative side and planned procedure. The patient was given prophylactic antibiotics within one hour of skin incision.  The patient was also given TXA before surgery was commenced.    The hip was prepped and draped in the usual sterile fashion. The Mamadou array pins were placed into the iliac crest with a small incision while avoiding injury to surrounding structures. The patient had complete paralysis at this point to prevent traction related injury.   A modified Hueter approach was performed.  The incision was carried through the subcutaneous tissue to the underlying tensor fascia , which were incised.  The tensor muscle was teased off of the fascia.  Retractors were then placed to further develop the interval between the tensor fascia and the rectus femoris.  The ascending branches of the lateral femoral circumflex vessels were coagulated.  Retractors were then placed under the rectus and over the medial capsule and also the superior capsule.  The capsulotomy was then performed and suture were placed in both the medial and lateral portions.  Sufficient medial capsular release was performed. Check points were placed to the greater trochanter and pelvis and the data for the Mamadou  was collected.  The femoral neck cut was performed after verifying its position using the Mamadou tool to barbara out the neck cut.  With slight traction and external rotation, the femoral head was removed.  Retractors were then placed to expose the acetabulum.   The acetabulum was reamed to give a line to line press fit, using the Mamadou Robotic arm for assistance. The acetabular component was impacted into position targeting 40 degrees of abduction and 20 degrees of anteversion.   Screw fixation was utilized to enhance acetabular component fixation. The acetabular liner was placed and impacted into position. The liner locking mechanism engaged. Attention was then turned to the femoral side.    With the femur rotated to 120 degrees without any traction, a retractor was placed medially to the calcar.  The superolateral capsular release was then performed to allow for delivering the femur up into the incision.  The hip was then placed in extension and adduction.  Retractor was then placed over the tip of the greater trochanter.  Femoral  preparation was started with a box osteotome and canal finder. The canal was sequentially broached to a stable fit. The final broach was impacted into position in approximately 10 degrees of anteversion, following the posterior calcar for assistance with version. The trial head was placed.  Retractors were removed.  The leg was then brought into neutral, and then traction was applied with internal rotation to reduce the hip.  The Mamadou was used to check the offset and leg lengths. The hip stability was assessed. The hip was stable in extension and external rotation as well as in flexion, adduction of 20 degrees and internal rotation to 80 degrees. The hip was dislocated, the trial broach was removed, the canal irrigated with pulsatile lavage and dried, and the final stem was inserted in routine fashion. After placement of the stem, the trunnion was cleansed and dried and the modular head  firmly impacted in place and the hip reduced. Again, leg length assessment and stability assessment of the hip were performed to confirm optimal component selection. The wound was irrigated with sterile dilute betadine solution and hemostasis obtained. The pericapsular injection was performed with the local anesthetic.  The anterior hip capsule was repaired utilizing the #1 Vicryl.  The tensor fascia was then closed with #1 Vicryl. The subcutaneous tissue was closed in layers with 2-0 Vicryl followed by Stratafix for skin closure using a running subcuticular and glue and mesh dressing followed by an Aquacel dressing after the glue and mesh dressing had dried.   SCD was placed to the operative limb.    The patient tolerated the procedure well and was taken to the recovery room in good condition. Complications: none.  Sponge, instrument, and needle counts were correct x 2.    The patient underwent risk stratification preoperatively and ASA 81 mg BID was chosen for DVT prophylaxis.     I was present for the entire procedure.      Lorenzo Joe  Phone Number: [phone]

## 2023-11-10 NOTE — ANESTHESIA PROCEDURE NOTES
Airway  Date/Time: 11/10/2023 11:03 AM  Urgency: elective    Airway not difficult    Staffing  Performed: ROBYN   Authorized by: Mario Sim DO    Performed by: ROBYN Montes De Oca  Patient location during procedure: OR    Indications and Patient Condition  Indications for airway management: anesthesia and airway protection  Spontaneous Ventilation: absent  Sedation level: deep  Preoxygenated: yes  Patient position: sniffing  MILS maintained throughout  Mask difficulty assessment: 1 - vent by mask    Final Airway Details  Final airway type: endotracheal airway      Successful airway: ETT  Cuffed: yes   Successful intubation technique: video laryngoscopy  Facilitating devices/methods: intubating stylet  Endotracheal tube insertion site: oral  Blade: Davie  Blade size: #3  ETT size (mm): 7.0  Cormack-Lehane Classification: grade I - full view of glottis  Placement verified by: chest auscultation and capnometry   Inital cuff pressure (cm H2O): 12  Cuff volume (mL): 6  Measured from: lips  ETT to lips (cm): 23  Number of attempts at approach: 1  Number of other approaches attempted: 0

## 2023-11-11 VITALS
DIASTOLIC BLOOD PRESSURE: 49 MMHG | OXYGEN SATURATION: 98 % | HEART RATE: 82 BPM | SYSTOLIC BLOOD PRESSURE: 102 MMHG | RESPIRATION RATE: 16 BRPM | WEIGHT: 147 LBS | BODY MASS INDEX: 23.07 KG/M2 | TEMPERATURE: 97.3 F | HEIGHT: 67 IN

## 2023-11-11 PROCEDURE — 97110 THERAPEUTIC EXERCISES: CPT | Mod: GP,CQ

## 2023-11-11 PROCEDURE — 97165 OT EVAL LOW COMPLEX 30 MIN: CPT | Mod: GO | Performed by: OCCUPATIONAL THERAPIST

## 2023-11-11 PROCEDURE — 97116 GAIT TRAINING THERAPY: CPT | Mod: GP

## 2023-11-11 PROCEDURE — 2500000001 HC RX 250 WO HCPCS SELF ADMINISTERED DRUGS (ALT 637 FOR MEDICARE OP): Performed by: PHYSICIAN ASSISTANT

## 2023-11-11 PROCEDURE — 2500000004 HC RX 250 GENERAL PHARMACY W/ HCPCS (ALT 636 FOR OP/ED): Performed by: PHYSICIAN ASSISTANT

## 2023-11-11 PROCEDURE — 97161 PT EVAL LOW COMPLEX 20 MIN: CPT | Mod: GP

## 2023-11-11 PROCEDURE — 7100000011 HC EXTENDED STAY RECOVERY HOURLY - NURSING UNIT

## 2023-11-11 RX ADMIN — ACETAMINOPHEN 325 MG: 325 TABLET ORAL at 14:42

## 2023-11-11 RX ADMIN — SODIUM CHLORIDE, SODIUM LACTATE, POTASSIUM CHLORIDE, AND CALCIUM CHLORIDE 1000 ML: 600; 310; 30; 20 INJECTION, SOLUTION INTRAVENOUS at 13:00

## 2023-11-11 RX ADMIN — DOCUSATE SODIUM 100 MG: 100 CAPSULE, LIQUID FILLED ORAL at 09:26

## 2023-11-11 RX ADMIN — ASPIRIN 81 MG: 81 TABLET, COATED ORAL at 09:26

## 2023-11-11 ASSESSMENT — COGNITIVE AND FUNCTIONAL STATUS - GENERAL
STANDING UP FROM CHAIR USING ARMS: A LITTLE
MOVING TO AND FROM BED TO CHAIR: A LITTLE
TURNING FROM BACK TO SIDE WHILE IN FLAT BAD: A LITTLE
STANDING UP FROM CHAIR USING ARMS: A LITTLE
CLIMB 3 TO 5 STEPS WITH RAILING: A LITTLE
CLIMB 3 TO 5 STEPS WITH RAILING: A LITTLE
PERSONAL GROOMING: A LITTLE
DAILY ACTIVITIY SCORE: 19
MOBILITY SCORE: 19
TURNING FROM BACK TO SIDE WHILE IN FLAT BAD: A LITTLE
MOBILITY SCORE: 19
WALKING IN HOSPITAL ROOM: A LITTLE
TOILETING: A LITTLE
MOVING TO AND FROM BED TO CHAIR: A LITTLE
DRESSING REGULAR LOWER BODY CLOTHING: A LITTLE
HELP NEEDED FOR BATHING: A LITTLE
WALKING IN HOSPITAL ROOM: A LITTLE
DRESSING REGULAR UPPER BODY CLOTHING: A LITTLE

## 2023-11-11 ASSESSMENT — PAIN SCALES - GENERAL
PAINLEVEL_OUTOF10: 0 - NO PAIN
PAINLEVEL_OUTOF10: 4
PAINLEVEL_OUTOF10: 0 - NO PAIN
PAINLEVEL_OUTOF10: 0 - NO PAIN

## 2023-11-11 ASSESSMENT — PAIN - FUNCTIONAL ASSESSMENT
PAIN_FUNCTIONAL_ASSESSMENT: 0-10

## 2023-11-11 NOTE — PROGRESS NOTES
"Subjective   No acute events overnight. Was able to sleep 3-4 hours. She refused the last dose of cefazolin as she thinks this lead her nausea. I shared with her that it is unlikely to be the source of her nausea and counseled her on the importance of antibiotics in reducing PJI risk. She understands the potential consequence. She denies nausea, chest pain, shortness of breath, fevers, chill.  Tolerating PO.  Able to void.       Objective     Awake, alert. Speaks clearly  Nonlabored breathing RA  Ext: dressing clean, dry, intact, no sign of hematoma, distally NVI, able to flex and extend knee, hip, intact EHL/FHL/TA/GA. SCDs in place bilaterally, no sign of DVT    Last Recorded Vitals  Blood pressure 125/82, pulse 80, temperature 36.4 °C (97.5 °F), temperature source Temporal, resp. rate 16, height 1.702 m (5' 7\"), weight 66.7 kg (147 lb), SpO2 95 %.  Intake/Output last 3 Shifts:  I/O last 3 completed shifts:  In: 1003.3 (15 mL/kg) [I.V.:1003.3 (15 mL/kg)]  Out: 800 (12 mL/kg) [Urine:650 (0.3 mL/kg/hr); Blood:150]  Dosing Weight: 66.7 kg       Scheduled medications  acetaminophen, 650 mg, oral, q6h  aspirin, 81 mg, oral, BID  ceFAZolin, 2 g, intravenous, q8h  docusate sodium, 100 mg, oral, BID  polyethylene glycol, 17 g, oral, Daily      Continuous medications  lactated Ringer's, 100 mL/hr, Last Rate: 100 mL/hr (11/11/23 0544)  oxygen, 2 L/min, Last Rate: Stopped (11/10/23 3690)      PRN medications  PRN medications: benzocaine-menthol, cyclobenzaprine, morphine, ondansetron, oxyCODONE, oxyCODONE  No results found for this or any previous visit (from the past 24 hour(s)).        Postop xrays reviewed and show implants in satisfactory position without complication    Labs: no clinical indication for labs this AM      Assessment/Plan   Principal Problem:    Hip osteoarthritis    -Doing well POD # 1 following Left anterior total hip replacement. Nausea post op has improved. Continue PO fluids.  Continue to mobilize " with PT/OT, Full weightbearing LLE, walker for assist.  Dressings dressing to remain in place.    -VTE prophylaxis:  Chemoprophylaxis with ASA 81 mg BID for 35 days.  Mechanical prophylaxis to incorporate SCDs and early ambulation as tolerated.  -GI prophylaxis:  PPI, stool softener.  -Pain control:  Scheduled tylenol, intermittent oxycodone, ice PRN  -Disposition:  Plan for discharge to home today after PT. She ambulated well using walker alongside me this AM, will need practice getting leg into bed. She has a couple stairs into home but living space all on one level. To begin home PT on discharge. DC meds have been sent, written instructions in chart.    Lorenzo Joe MD

## 2023-11-11 NOTE — PROGRESS NOTES
Physical Therapy    Physical Therapy Evaluation    Patient Name: Rachel Westbrook  MRN: [id number]  Today's Date: 11/11/2023   Time Calculation  Start Time: 0829  Stop Time: 0853  Time Calculation (min): 24 min  3476    Assessment/Plan   PT Assessment: Pt demonstrates decreased strength, decreased activity tolerance, decreased ROM, and impaired balance/mobility.  Based on current level of function, pt would benefit from continued skilled therapy while in the hospital to ensure safety, decrease risk of falls, and regains strength/mobility back to baseline.  Once stable enough for discharge, it is anticipated that pt will be safe to return home and would benefit from low intensity therapy.   PT Assessment Results: Decreased strength, Decreased range of motion, Decreased endurance, Impaired balance, Decreased mobility, Decreased safety awareness  Rehab Prognosis: Good  Evaluation/Treatment Tolerance: Patient limited by fatigue (limited by dizziness/nausea)  Medical Staff Made Aware: Yes  End of Session Communication: Bedside nurse  End of Session Patient Position: Up in chair, Alarm on  IP OR SWING BED PT PLAN  Inpatient or Swing Bed: Inpatient  PT Plan  Treatment/Interventions: Bed mobility, Transfer training, Gait training, Stair training, Balance training, Neuromuscular re-education, Strengthening, Therapeutic exercise, Therapeutic activity  PT Frequency: BID  Equipment Recommended upon Discharge: Wheeled walker  PT Recommended Transfer Status: Contact guard    Subjective     Current Problem:  Patient Active Problem List   Diagnosis    Arthropathy    Bilateral occipital neuralgia    Cervical radiculopathy at C7    Cervical spondylosis without myelopathy    Cervicothoracic somatic dysfunction    Chronic musculoskeletal pain    Dizziness, nonspecific    Dysuria    Gastritis, chronic    Hematuria    Lumbosacral dysfunction    Mixed incontinence urge and stress    Osteoarthritis, multiple sites    Segmental and somatic  dysfunction of thoracic region    Thoracic radiculitis    Trochanteric bursitis of both hips    Urinary frequency    Hip osteoarthritis       General Visit Information:  POD #1 L LOTUS  General: On arrival, pt supine in bed.  Pt in no apparent distress and agreeable to therapy.  Reason for Referral: L LOTUS  Referred By: Lorenzo Joe  Co-Treatment: OT  Prior to Session Communication: Bedside nurse  Patient Position Received: Bed, 3 rail up, Alarm on    Home Living/PLOF:  Pt lives with son in 2 story home with 3 AUDREY with rail.  Pt has 1 floor set up.  Has tub shower without DME.  Owns FWW.  Indep with mobility and ADLs prior to surgery.  Pt drives and does grocery shopping.  Pt denies any falls.     Precautions:  Precautions  Medical Precautions: Fall precautions  Precautions Comment: LLE WBAT       Objective     Pain:  Pain Assessment  Pain Assessment: 0-10  Pain Score: 0 - No pain    Cognition:  Cognition  Overall Cognitive Status: Within Functional Limits    General Assessments:      Activity Tolerance  Endurance: Tolerates less than 10 min exercise, no significant change in vital signs    Static Sitting Balance  Static Sitting-Comment/Number of Minutes: Good  Dynamic Sitting Balance  Dynamic Sitting-Comments: Good  Static Standing Balance  Static Standing-Comment/Number of Minutes: fair plus  Dynamic Standing Balance  Dynamic Standing-Comments: fair    Functional Assessments:  Bed Mobility   Supine to sit: CGA  Transfers  Sit to stand: CGA with Vcs for hand placement  Stand to sit: CGA with Vcs for hand placement   Ambulation/Gait Training  Pt ambulated 150ft with CGA and FWW with Vcs for safety and standing upright; antalgic gait with decreased garrick and decreased stride length     Extremity/Trunk Assessments:  RLE ROM/strength: WFL  LLE ROM/strength: NT secondary to recent surgery     Outcome Measures:  [facility] Basic Mobility  Turning from your back to your side while in a flat bed without using bedrails:  None  Moving from lying on your back to sitting on the side of a flat bed without using bedrails: A little  Moving to and from bed to chair (including a wheelchair): A little  Standing up from a chair using your arms (e.g. wheelchair or bedside chair): A little  To walk in hospital room: A little  Climbing 3-5 steps with railing: A little  Basic Mobility - Total Score: 19    Goals:  Encounter Problems                PT Problem       Pt will be able to perform all bed mobility tasks with Mod I.  (Progressing)       Start:  11/11/23    Expected End:  11/25/23            Pt will perform all transfers with Mod I and FWW with proper safety mechanics.   (Progressing)       Start:  11/11/23    Expected End:  11/25/23            Pt will ambulate 250ft with Mod I using FWW for improved functional independence.  (Progressing)       Start:  11/11/23    Expected End:  11/25/23            Pt will be able to negotiate 3 steps with 1 HR with SBA.  (Progressing)       Start:  11/11/23    Expected End:  11/25/23            Pt will demonstrate good dynamic stand balance for completion of therapeutic exercises and functional tasks.  (Progressing)       Start:  11/11/23    Expected End:  11/25/23                   Education Documentation  Home Exercise Program, taught by Bertha Thapa, PT at 11/11/2023 11:35 AM.  Learner: Patient  Readiness: Acceptance  Method: Explanation  Response: Verbalizes Understanding    Mobility Training, taught by Bertha Thapa, PT at 11/11/2023 11:34 AM.  Learner: Patient  Readiness: Acceptance  Method: Explanation  Response: Verbalizes Understanding    Education Comments  No comments found.

## 2023-11-11 NOTE — DISCHARGE SUMMARY
Discharge Diagnosis  Hip osteoarthritis    Issues Requiring Follow-Up  None    Test Results Pending At Discharge  Pending Labs       No current pending labs.            Hospital Course  S/p Left anterior total hip replacement with Mamadou robotic assistance 11/10/23. Some post op nausea so stayed overnight. Nausea improved this AM. Pending PT eval today. Ambulated well with walker with me this AM. ASA 81mg BID for DVT ppx to be continued for 35 days total post op. SCDs in hospital. Received ancef for SCIP but she declined the last dose as she thought it may be contributing her to prior nausea. Not likely to be the case. Counseled her on potential increased risk of PJI with noncompliance. She understood the risk. Tolerating PO and able to void. Plan for DC home today if does well with PT/OT.    Home Medications     Medication List      START taking these medications     acetaminophen 500 mg tablet; Commonly known as: Tylenol; Take 2 tablets   (1,000 mg) by mouth 3 times a day for 14 days. Take two tablets by mouth   every 8 hours for 2 weeks for pain after surgery   aspirin 81 mg EC tablet; Take 1 tablet (81 mg) by mouth 2 times a day.   Take one tablet by mouth twice daily for 35 days for blood clot prevention   after surgery   cephalexin 500 mg capsule; Commonly known as: Keflex; Take 1 capsule   (500 mg) by mouth 3 times a day for 7 days. Take one capsule by mouth   every 8 hours for infection prevention after surgery   docusate sodium 100 mg capsule; Commonly known as: Colace; Take 1   capsule (100 mg) by mouth 2 times a day for 7 days. Take one capsule by   mouth twice daily as needed for constipation prevention while taking   narcotics   ondansetron ODT 4 mg disintegrating tablet; Commonly known as:   Zofran-ODT; Take 1 tablet (4 mg) by mouth every 8 hours if needed for   nausea or vomiting for up to 3 days. Take one tablet (oral dissolving) by   mouth as needed every 8 hours for nausea   oxyCODONE 5 mg immediate  release tablet; Commonly known as: Roxicodone;   Take 1 tablet (5 mg) by mouth 4 times a day as needed (Breakthrough pain)   for up to 7 days. Take one tablet by mouth every 6 hours as needed for   breakthrough pain     CONTINUE taking these medications     cholecalciferol 50 MCG (2000 UT) tablet; Commonly known as: Vitamin D-3   multivitamin with minerals tablet   PROBIOTIC ORAL   zinc gluconate 50 mg tablet     STOP taking these medications     chlorhexidine 0.12 % solution; Commonly known as: Peridex       Outpatient Follow-Up  Follow up with  as scheduled on 11/30/23.    Lorenzo Joe MD

## 2023-11-11 NOTE — PROGRESS NOTES
Occupational Therapy    Evaluation    Patient Name: Rachel Westbrook  MRN: [id number]  Today's Date: 11/11/2023  Time Calculation  Start Time: 0829  Stop Time: 0855  Time Calculation (min): 26 min  Eval only     Assessment  IP OT Assessment  Prognosis: Good  Evaluation/Treatment Tolerance: Patient tolerated treatment well  Medical Staff Made Aware: Yes  End of Session Communication: Bedside nurse  End of Session Patient Position: Up in chair, Alarm on  Patient presents with decline in ADLs, functional transfers, functional mobility and would benefit from OT during acute stay to improve functional independence and safety. Recommend low intensity OT to maximize functional independence and safety.     Plan:  Treatment Interventions: ADL retraining, Functional transfer training, Patient/family training, Equipment evaluation/education, Compensatory technique education  OT Frequency: 1 time per day until discharge  OT Discharge Recommendations: Low intensity level of continued care  Equipment Recommended upon Discharge: Wheeled walker, Other (comment) (transfer bench (handout provided), sock aide, reacher)  OT Recommended Transfer Status: Assist of 1    Subjective     Current Problem:  1. S/P total left hip arthroplasty  aspirin 81 mg EC tablet    acetaminophen (Tylenol) 500 mg tablet    docusate sodium (Colace) 100 mg capsule    ondansetron ODT (Zofran-ODT) 4 mg disintegrating tablet    oxyCODONE (Roxicodone) 5 mg immediate release tablet    cephalexin (Keflex) 500 mg capsule          General:  General  Reason for Referral: left ANTERIOR THR  Referred By: Lorenzo Joe MD  Past Medical History Relevant to Rehab: Admitted 11/10/2023 after having left anterior THR completed by Dr Joe.  Co-Treatment: PT  Co-Treatment Reason: for safety and to maximize independence  Prior to Session Communication: Bedside nurse  Patient Position Received: Bed, 3 rail up  Preferred Learning Style: visual, verbal  General Comment: RN cleared  patient to work with therapy.  Patient in long legged sitting in bed and agreeable to participate in OT evaluation.    Precautions:  LE Weight Bearing Status: Weight Bearing as Tolerated  Medical Precautions: Fall precautions      Pain:  Pain Assessment  Pain Assessment: 0-10  Pain Score: 0 - No pain    Objective     Cognition:  Overall Cognitive Status: Within Functional Limits             Home Living:  Home Living Comments: Lives at home alone with tub shower with no DME.     Prior Function:  Prior Function Comments: Was independent with all ADLs and IADLs    ADL:  LE Dressing Assistance: Stand by  ADL Comments: Educated patient on safety and compensatory strategies for lower body dressing.  Patient verbalized understanding and able to provide return demonstration.  Educated patient on the use of sock aide to don socks.    Activity Tolerance:  Endurance: Endurance does not limit participation in activity    Bed Mobility/Transfers:   Bed Mobility  Bed Mobility:  (CGA supine to sit at edge of bed.  Patient reported feeling dizzy and nauseous once seated at edge of bed.)  Transfers  Transfer:  (CGA sit <> stand with min verbal cues for proper hand placement and technique. CGA with WW functional mobilty task with chair follow secondary to reports of feeling dizzy and nauseous. Notified RN)  Educated patient on safety with car transfers and patient verbalized understanding.      Sensation:  Light Touch: No apparent deficits    Extremities: RUE   RUE : Within Functional Limits and LUE   LUE: Within Functional Limits    Outcome Measures: [facility] Daily Activity  Putting on and taking off regular lower body clothing: A little  Bathing (including washing, rinsing, drying): A little  Putting on and taking off regular upper body clothing: A little  Toileting, which includes using toilet, bedpan or urinal: A little  Taking care of personal grooming such as brushing teeth: A little  Eating Meals: None  Daily Activity - Total  Score: 19    EDUCATION:  Education  Individual(s) Educated: Patient  Education Provided: Fall precautons  Patient/Caregiver Demonstrated Understanding: yes  Patient Response to Education: Patient/Caregiver Verbalized Understanding of Information        Goals:   Encounter Problems       Encounter Problems (Active)       Balance       STG-Patient will be independent with assistive device dynamic stand task >5 minutes for ADL completion   (Progressing)       Start:  11/11/23    Expected End:  11/25/23               Dressings Lower Extremities       STG - Patient will complete lower body dressing independently demonstrating good safety  (Progressing)       Start:  11/11/23    Expected End:  11/25/23               Mobility       STG-Patient will be independent with assistive device functional mobility tasks   (Progressing)       Start:  11/11/23    Expected End:  11/25/23               Transfers       STG - Patient will perform car transfers independently (Progressing)       Start:  11/11/23    Expected End:  11/25/23            STG-Patient will be independent with functional transfers demonstrating good safety   (Progressing)       Start:  11/11/23    Expected End:  11/25/23

## 2023-11-11 NOTE — CARE PLAN
The patient's goals for the shift include pain control and safety. Pt achieved adequate pain control by the time of discharge and remained safe and injury free up until time of discharge.

## 2023-11-11 NOTE — PROGRESS NOTES
Physical Therapy    Physical Therapy Treatment    Patient Name: Rachel Westbrook  MRN: [id number]  Today's Date: 11/11/2023  Time Calculation  Start Time: 1200  Stop Time: 1223  Time Calculation (min): 23 min       Assessment/Plan   PT Assessment  PT Assessment Results: Decreased strength, Decreased range of motion, Decreased endurance, Impaired balance, Decreased mobility, Decreased safety awareness  Rehab Prognosis: Good  Evaluation/Treatment Tolerance:  (Patient limited by dizziness)  Medical Staff Made Aware: Yes  End of Session Communication: Bedside nurse  End of Session Patient Position: Bed, 3 rail up, Alarm on     PT Plan  Treatment/Interventions: Bed mobility, Transfer training, Gait training, Stair training, Balance training, Neuromuscular re-education, Strengthening, Therapeutic exercise, Therapeutic activity  PT Frequency: BID  PT Discharge Recommendations: Low intensity level of continued care  Equipment Recommended upon Discharge:  (FWW)  PT Recommended Transfer Status: Contact guard      General Visit Information:   PT  Visit  PT Received On: 11/11/23  General  Reason for Referral: L LOTUS  Referred By: Lorenzo Joe  Prior to Session Communication: Bedside nurse (Appropriate for therapy)  Patient Position Received: Up in chair, Alarm off, not on at start of session  Preferred Learning Style: verbal  General Comment: Patient agreeable to treatment session    Subjective   Precautions:  Precautions  LE Weight Bearing Status: Weight Bearing as Tolerated  Medical Precautions: Fall precautions  Post-Surgical Precautions: Left hip precautions  Precautions Comment: LLE WBAT  Vital Signs:       Objective   Pain:  Pain Assessment  Pain Assessment: 0-10  Pain Score: 0 - No pain  Cognition:  Cognition  Overall Cognitive Status: Within Functional Limits  Postural Control:     Extremity/Trunk Assessments:                    Activity Tolerance:  Activity Tolerance  Endurance: Decreased tolerance for upright activites  (Due to dizziness and nausea)  Treatments:  Therapeutic Exercise  Therapeutic Exercise Performed: Yes (Seated knee flex/ext , APs 2 x10)                                  Ambulation/Gait Training  Ambulation/Gait Training Performed: Yes (60ft. x 2  CGA)  Transfers  Transfer: Yes (STS x 2 ; emphasis on proper technique, and CGA for safety)    Stairs  Stairs: Yes (Ascended/Descending 3 steps , R hand rail with CGA)           Other Activity  Other Activity Performed: No    Outcome Measures:             [facility] Basic Mobility  Turning from your back to your side while in a flat bed without using bedrails: None  Moving from lying on your back to sitting on the side of a flat bed without using bedrails: A little  Moving to and from bed to chair (including a wheelchair): A little  Standing up from a chair using your arms (e.g. wheelchair or bedside chair): A little  To walk in hospital room: A little  Climbing 3-5 steps with railing: A little  Basic Mobility - Total Score: 19                                                                                          Education Documentation  No documentation found.  Education Comments  No comments found.        OP EDUCATION:  Education  Individual(s) Educated: Patient  Education Provided: Fall Risk (Remaining with FWW at all times .)  Patient Response to Education: Patient/Caregiver Verbalized Understanding of Information    Encounter Problems       Encounter Problems (Active)       Balance       STG-Patient will be independent with assistive device dynamic stand task >5 minutes for ADL completion   (Progressing)       Start:  11/11/23    Expected End:  11/25/23               Mobility       STG-Patient will be independent with assistive device functional mobility tasks   (Progressing)       Start:  11/11/23    Expected End:  11/25/23               PT Problem       Pt will be able to perform all bed mobility tasks with Mod I.  (Progressing)       Start:  11/11/23    Expected End:   11/25/23            Pt will perform all transfers with Mod I and FWW with proper safety mechanics.   (Progressing)       Start:  11/11/23    Expected End:  11/25/23            Pt will ambulate 250ft with Mod I using FWW for improved functional independence.  (Progressing)       Start:  11/11/23    Expected End:  11/25/23            Pt will be able to negotiate 3 steps with 1 HR with SBA.  (Progressing)       Start:  11/11/23    Expected End:  11/25/23            Pt will demonstrate good dynamic stand balance for completion of therapeutic exercises and functional tasks.  (Progressing)       Start:  11/11/23    Expected End:  11/25/23               Pain - Adult          Transfers       STG - Patient will perform car transfers independently (Progressing)       Start:  11/11/23    Expected End:  11/25/23            STG-Patient will be independent with functional transfers demonstrating good safety   (Progressing)       Start:  11/11/23    Expected End:  11/25/23

## 2023-11-11 NOTE — NURSING NOTE
Patient ambulated to restroom with walker voided 250 ml  
Patient refused 0300 ANCEF. Patient was educated on importance of medication compliance.  
Pt discharged to home via w/c with son. Given discharge instructions as per dr. Joe.   
No

## 2023-12-16 ENCOUNTER — LAB (OUTPATIENT)
Dept: LAB | Facility: LAB | Age: 63
End: 2023-12-16
Payer: COMMERCIAL

## 2023-12-16 DIAGNOSIS — L12.0 BULLOUS PEMPHIGOID (MULTI): Primary | ICD-10-CM

## 2023-12-16 PROCEDURE — 36415 COLL VENOUS BLD VENIPUNCTURE: CPT

## 2023-12-26 LAB — SCAN RESULT: NORMAL

## 2024-02-15 ENCOUNTER — APPOINTMENT (OUTPATIENT)
Dept: DERMATOLOGY | Facility: CLINIC | Age: 64
End: 2024-02-15
Payer: COMMERCIAL

## 2024-10-01 ENCOUNTER — HOSPITAL ENCOUNTER (OUTPATIENT)
Dept: ORTHOPEDIC SURGERY | Age: 64
Discharge: HOME | End: 2024-10-03
Payer: MEDICARE

## 2024-10-01 ENCOUNTER — OFFICE VISIT (OUTPATIENT)
Dept: ORTHOPEDIC SURGERY | Age: 64
End: 2024-10-01
Payer: MEDICARE

## 2024-10-01 VITALS
HEART RATE: 71 BPM | BODY MASS INDEX: 24.64 KG/M2 | OXYGEN SATURATION: 99 % | HEIGHT: 67 IN | TEMPERATURE: 97.3 F | WEIGHT: 157 LBS

## 2024-10-01 DIAGNOSIS — M16.11 PRIMARY OSTEOARTHRITIS OF RIGHT HIP: Primary | ICD-10-CM

## 2024-10-01 DIAGNOSIS — M25.551 RIGHT HIP PAIN: ICD-10-CM

## 2024-10-01 PROCEDURE — 1123F ACP DISCUSS/DSCN MKR DOCD: CPT | Performed by: STUDENT IN AN ORGANIZED HEALTH CARE EDUCATION/TRAINING PROGRAM

## 2024-10-01 PROCEDURE — 73502 X-RAY EXAM HIP UNI 2-3 VIEWS: CPT

## 2024-10-01 PROCEDURE — G8484 FLU IMMUNIZE NO ADMIN: HCPCS | Performed by: STUDENT IN AN ORGANIZED HEALTH CARE EDUCATION/TRAINING PROGRAM

## 2024-10-01 PROCEDURE — 1090F PRES/ABSN URINE INCON ASSESS: CPT | Performed by: STUDENT IN AN ORGANIZED HEALTH CARE EDUCATION/TRAINING PROGRAM

## 2024-10-01 PROCEDURE — G8400 PT W/DXA NO RESULTS DOC: HCPCS | Performed by: STUDENT IN AN ORGANIZED HEALTH CARE EDUCATION/TRAINING PROGRAM

## 2024-10-01 PROCEDURE — 1036F TOBACCO NON-USER: CPT | Performed by: STUDENT IN AN ORGANIZED HEALTH CARE EDUCATION/TRAINING PROGRAM

## 2024-10-01 PROCEDURE — 99205 OFFICE O/P NEW HI 60 MIN: CPT | Performed by: STUDENT IN AN ORGANIZED HEALTH CARE EDUCATION/TRAINING PROGRAM

## 2024-10-01 PROCEDURE — 3017F COLORECTAL CA SCREEN DOC REV: CPT | Performed by: STUDENT IN AN ORGANIZED HEALTH CARE EDUCATION/TRAINING PROGRAM

## 2024-10-01 PROCEDURE — G8427 DOCREV CUR MEDS BY ELIG CLIN: HCPCS | Performed by: STUDENT IN AN ORGANIZED HEALTH CARE EDUCATION/TRAINING PROGRAM

## 2024-10-01 PROCEDURE — G8420 CALC BMI NORM PARAMETERS: HCPCS | Performed by: STUDENT IN AN ORGANIZED HEALTH CARE EDUCATION/TRAINING PROGRAM

## 2024-10-01 RX ORDER — DIAPER,BRIEF,INFANT-TODD,DISP
EACH MISCELLANEOUS
COMMUNITY
Start: 2024-09-28

## 2024-10-01 RX ORDER — NYSTATIN 100000 [USP'U]/ML
4 SUSPENSION ORAL 4 TIMES DAILY PRN
COMMUNITY
Start: 2024-08-05

## 2024-10-01 RX ORDER — CALCIUM CARBONATE/VITAMIN D3 600MG-62.5
1 CAPSULE ORAL
COMMUNITY

## 2024-10-01 NOTE — PROGRESS NOTES
disease is such that therapy could cause increased inflammation.    ASSISTIVE DEVICES  Patient uses the walls of her home to help with mobility    SAFETY ISSUES  Patient is at a risk of falling.    PREOPERATIVE IMAGING  X-rays demonstrate marked joint space narrowing, subchondral cystic change, subchondral sclerosis, and osteophyte formation    MDM is high as this is a chronic problem with severe progression and involved decision making regarding major elective surgery with discussion of identified patient and procedure specific risk factors as outlined above and decision was made for surgery.         [facility] Surgery Scheduling     [facility]  Ph: [phone]       Ph: [phone]   Fx: [phone]       Fx: [phone]    Surgery Scheduling, Pre-Op and Post-Op Information Form  Call to Pre-[place] at [phone] at least 24 hours Prior to Procedure Date     Surgery Location: [facility] Surgery: [facility] [address] [id number]    OFFICE   Surgeon: Stan Clark MD  Surgery Date: 2024  time: TBD   Patient: Nora Eisenberg   : 1960   SS#: [id number]  Gender: female  Home Phone: [phone] Mobile Phone: [phone]  Emergency Contact: Javan Jenkins Phone: [phone]  Insurance Co: Payor: [facility] MEDICARE /  /  /  ID No:  [id number]        PROVIDER  Diagnosis: Right hip DJD  Procedure/Consent: Right anterior total hip replacement with Todd robotic assistance  CPT CODES: 52193  Case Comments/Implants: Laurens table, large C arm, 2 assistants, Todd  Surgery Schedule Type: Outpatient  Anesthesia Requested: Choice  Vendor Information: [facility]  Referring Family Doctor: Jenniffer Bryan MD     PAT  [x] [facility] PAT Date/Time: _________________________________________________________  History & Physical: [] Physician will Provide [] Attached [] Dictated [] Other  [x] Follow Anesthesia Pre-Op Orders for X-rays, [facility]     [x] SN & PT to  hospice per medical team/home

## 2024-10-11 ENCOUNTER — PREP FOR PROCEDURE (OUTPATIENT)
Dept: ORTHOPEDIC SURGERY | Age: 64
End: 2024-10-11

## 2024-10-11 DIAGNOSIS — M16.11 PRIMARY OSTEOARTHRITIS OF RIGHT HIP: ICD-10-CM

## 2024-10-15 ENCOUNTER — HOSPITAL ENCOUNTER (OUTPATIENT)
Dept: CT IMAGING | Age: 64
Discharge: HOME | End: 2024-10-17
Attending: STUDENT IN AN ORGANIZED HEALTH CARE EDUCATION/TRAINING PROGRAM
Payer: MEDICARE

## 2024-10-15 DIAGNOSIS — M25.551 RIGHT HIP PAIN: ICD-10-CM

## 2024-10-15 PROCEDURE — 73700 CT LOWER EXTREMITY W/O DYE: CPT

## 2024-10-29 ENCOUNTER — HOSPITAL ENCOUNTER (OUTPATIENT)
Dept: PREADMISSION TESTING | Age: 64
Discharge: HOME | End: 2024-11-02
Payer: MEDICARE

## 2024-10-29 ENCOUNTER — OFFICE VISIT (OUTPATIENT)
Dept: ORTHOPEDIC SURGERY | Age: 64
End: 2024-10-29
Payer: MEDICARE

## 2024-10-29 VITALS
WEIGHT: 155 LBS | SYSTOLIC BLOOD PRESSURE: 118 MMHG | OXYGEN SATURATION: 96 % | HEART RATE: 88 BPM | HEIGHT: 67 IN | DIASTOLIC BLOOD PRESSURE: 86 MMHG | BODY MASS INDEX: 24.33 KG/M2 | TEMPERATURE: 97.8 F

## 2024-10-29 DIAGNOSIS — M16.11 PRIMARY OSTEOARTHRITIS OF RIGHT HIP: ICD-10-CM

## 2024-10-29 DIAGNOSIS — Z01.818 PRE-OP EXAM: Primary | ICD-10-CM

## 2024-10-29 LAB
ANION GAP SERPL CALCULATED.3IONS-SCNC: 8 MEQ/L (ref 9–15)
BASOPHILS # BLD: 0 K/UL (ref 0–0.2)
BASOPHILS NFR BLD: 0.4 %
BUN SERPL-MCNC: 12 MG/DL (ref 8–23)
CALCIUM SERPL-MCNC: 9.5 MG/DL (ref 8.5–9.9)
CHLORIDE SERPL-SCNC: 100 MEQ/L (ref 95–107)
CO2 SERPL-SCNC: 30 MEQ/L (ref 20–31)
CREAT SERPL-MCNC: 0.63 MG/DL (ref 0.5–0.9)
EOSINOPHIL # BLD: 0.1 K/UL (ref 0–0.7)
EOSINOPHIL NFR BLD: 0.7 %
ERYTHROCYTE [DISTWIDTH] IN BLOOD BY AUTOMATED COUNT: 12 % (ref 11.5–14.5)
GLUCOSE SERPL-MCNC: 94 MG/DL (ref 70–99)
HCT VFR BLD AUTO: 44.2 % (ref 37–47)
HGB BLD-MCNC: 14.2 G/DL (ref 12–16)
LYMPHOCYTES # BLD: 2.8 K/UL (ref 1–4.8)
LYMPHOCYTES NFR BLD: 39.6 %
MCH RBC QN AUTO: 30.4 PG (ref 27–31.3)
MCHC RBC AUTO-ENTMCNC: 32.1 % (ref 33–37)
MCV RBC AUTO: 94.6 FL (ref 79.4–94.8)
MONOCYTES # BLD: 0.4 K/UL (ref 0.2–0.8)
MONOCYTES NFR BLD: 6.1 %
NEUTROPHILS # BLD: 3.7 K/UL (ref 1.4–6.5)
NEUTS SEG NFR BLD: 53.1 %
PLATELET # BLD AUTO: 286 K/UL (ref 130–400)
POTASSIUM SERPL-SCNC: 4.5 MEQ/L (ref 3.4–4.9)
RBC # BLD AUTO: 4.67 M/UL (ref 4.2–5.4)
SODIUM SERPL-SCNC: 138 MEQ/L (ref 135–144)
WBC # BLD AUTO: 7 K/UL (ref 4.8–10.8)

## 2024-10-29 PROCEDURE — 80048 BASIC METABOLIC PNL TOTAL CA: CPT

## 2024-10-29 PROCEDURE — 99024 POSTOP FOLLOW-UP VISIT: CPT | Performed by: PHYSICIAN ASSISTANT

## 2024-10-29 PROCEDURE — 87641 MR-STAPH DNA AMP PROBE: CPT

## 2024-10-29 PROCEDURE — 93000 ELECTROCARDIOGRAM COMPLETE: CPT | Performed by: PHYSICIAN ASSISTANT

## 2024-10-29 PROCEDURE — 99424 PRIN CARE MGMT PHYS 1ST 30: CPT | Performed by: PHYSICIAN ASSISTANT

## 2024-10-29 PROCEDURE — 85025 COMPLETE CBC W/AUTO DIFF WBC: CPT

## 2024-10-29 RX ORDER — SODIUM CHLORIDE 0.9 % (FLUSH) 0.9 %
5-40 SYRINGE (ML) INJECTION PRN
OUTPATIENT
Start: 2024-11-04

## 2024-10-29 RX ORDER — SODIUM CHLORIDE 0.9 % (FLUSH) 0.9 %
5-40 SYRINGE (ML) INJECTION EVERY 12 HOURS SCHEDULED
OUTPATIENT
Start: 2024-11-04

## 2024-10-29 RX ORDER — CHLORHEXIDINE GLUCONATE 40 MG/ML
SOLUTION TOPICAL
Qty: 118 ML | Refills: 0 | Status: SHIPPED | OUTPATIENT
Start: 2024-10-29

## 2024-10-29 RX ORDER — SODIUM CHLORIDE, SODIUM LACTATE, POTASSIUM CHLORIDE, CALCIUM CHLORIDE 600; 310; 30; 20 MG/100ML; MG/100ML; MG/100ML; MG/100ML
INJECTION, SOLUTION INTRAVENOUS CONTINUOUS
OUTPATIENT
Start: 2024-11-04

## 2024-10-29 RX ORDER — SODIUM CHLORIDE 9 MG/ML
INJECTION, SOLUTION INTRAVENOUS PRN
OUTPATIENT
Start: 2024-11-04

## 2024-10-29 NOTE — H&P
[facility]    H&P: Preadmission Testing     Patient: Nora Eisenberg  YOB: 1960  MRN: [id number]    Subjective:   No chief complaint on file.      HPI: Nora Eisenberg is a 64 y.o. femaleis here for right total hip replacement to be performed by Dr. Clark.     There is no known history of heart disease or infarction.  There is no recent chest pain or discomfort, palpitations, shortness of breath, KLEIN, difficulties with exercise, bilateral ankle swelling.  Not taking any blood thinners.    There is no known history of obstructive or restrictive lung disease.    no smoking.  No recent wheezing or use of any kind of inhalers.  No recent hospitalizations for any lung-related illnesses. No recent Covid infection.    No known history of gastric issues which includes ulcers, herniations, bariatric surgeries.  No recent GI bleeds    No known history of hyper or hypercoagulable states.    They are not diabetic.  They have normal kidney function.     Past Medical History:        Diagnosis Date    History of TB (tuberculosis)     5/20/2014     Hyperlipidemia     3/3/2010      Past Surgical History:    Past Surgical History:   Procedure Laterality Date    OVARIAN CYST REMOVAL      2005 : dermoid cyst left        Medications Prior to Admission:    Current Outpatient Medications   Medication Sig Dispense Refill    Probiotic Product (PROBIOTIC-10 ULTIMATE) CAPS Take 1 capsule by mouth      Cholecalciferol 50 MCG (2000 UT) TABS Take 50 mcg by mouth daily      hydrocortisone 0.5 % cream Apply rectally to affected area two times a day      nystatin (MYCOSTATIN) 092326 UNIT/ML suspension Take 4 mLs by mouth 4 times daily as needed (Patient not taking: Reported on 10/29/2024)       No current facility-administered medications for this visit.       Allergies:    Cephalexin and Ciprofloxacin    Social History:   Social History     Socioeconomic History    Marital status: Single     Spouse

## 2024-10-29 NOTE — PATIENT INSTRUCTIONS
-If you experiencing pain, the only medication you can take for that is tylenol 650 mg 3-4x/day. Do not to exceed 4000 mg.   -Stop Asprin until after surgery     -Hibiclens was sent to your pharmacy to .  Please follow the instructions provided with the prescription and use daily starting 5 days before surgery.     -no eating / drinking the after midnight the night before surgery. Sips of water the morning of for all other medications is OK    -our surgery department will reach out the you the day before your surgery and let you know what time to arrive at the Outpatient Center (door B, same place as where you got blood work)    If you have any questions, please call our office and I will be happy to answer them

## 2024-10-30 LAB
MRSA, DNA, NASAL: NEGATIVE
SPECIMEN DESCRIPTION: NORMAL

## 2024-10-30 ASSESSMENT — HOOS JR
HOOS JR RAW SCORE: 22
HOOS JR TOTAL INTERVAL SCORE: 15.633
RISING FROM SITTING: SEVERE
BENDING TO THE FLOOR TO PICK UP OBJECT: EXTREME
GOING UP OR DOWN STAIRS: EXTREME
HOOS JR RAW SCORE: 22
SITTING: SEVERE
LYING IN BED (TURNING OVER, MAINTAINING HIP POSITION): EXTREME
WALKING ON UNEVEN SURFACE: EXTREME

## 2024-10-30 ASSESSMENT — PROMIS GLOBAL HEALTH SCALE
SUM OF RESPONSES TO QUESTIONS 2, 4, 5, & 10: 16
IN THE PAST 7 DAYS, HOW WOULD YOU RATE YOUR PAIN ON AVERAGE [ON A SCALE FROM 0 (NO PAIN) TO 10 (WORST IMAGINABLE PAIN)]?: 10 WORST IMAGINABLE PAIN
IN GENERAL, HOW WOULD YOU RATE YOUR PHYSICAL HEALTH [ON A SCALE OF 1 (POOR) TO 5 (EXCELLENT)]?: GOOD
IN THE PAST 7 DAYS, HOW WOULD YOU RATE YOUR FATIGUE ON AVERAGE [ON A SCALE FROM 1 (NONE) TO 5 (VERY SEVERE)]?: MILD
IN THE PAST 7 DAYS, HOW OFTEN HAVE YOU BEEN BOTHERED BY EMOTIONAL PROBLEMS, SUCH AS FEELING ANXIOUS, DEPRESSED, OR IRRITABLE [ON A SCALE FROM 1 (NEVER) TO 5 (ALWAYS)]?: NEVER
IN GENERAL, WOULD YOU SAY YOUR QUALITY OF LIFE IS...[ON A SCALE OF 1 (POOR) TO 5 (EXCELLENT)]: GOOD
IN GENERAL, HOW WOULD YOU RATE YOUR SATISFACTION WITH YOUR SOCIAL ACTIVITIES AND RELATIONSHIPS [ON A SCALE OF 1 (POOR) TO 5 (EXCELLENT)]?: VERY GOOD
TO WHAT EXTENT ARE YOU ABLE TO CARRY OUT YOUR EVERYDAY PHYSICAL ACTIVITIES SUCH AS WALKING, CLIMBING STAIRS, CARRYING GROCERIES, OR MOVING A CHAIR [ON A SCALE OF 1 (NOT AT ALL) TO 5 (COMPLETELY)]?: A LITTLE
IN GENERAL, WOULD YOU SAY YOUR HEALTH IS...[ON A SCALE OF 1 (POOR) TO 5 (EXCELLENT)]: GOOD
HOW IS THE PROMIS V1.1 BEING ADMINISTERED?: TELEPHONE
SUM OF RESPONSES TO QUESTIONS 3, 6, 7, & 8: 19
WHO IS THE PERSON COMPLETING THE PROMIS V1.1 SURVEY?: SELF
IN GENERAL, HOW WOULD YOU RATE YOUR MENTAL HEALTH, INCLUDING YOUR MOOD AND YOUR ABILITY TO THINK [ON A SCALE OF 1 (POOR) TO 5 (EXCELLENT)]?: VERY GOOD
IN GENERAL, PLEASE RATE HOW WELL YOU CARRY OUT YOUR USUAL SOCIAL ACTIVITIES (INCLUDES ACTIVITIES AT HOME, AT WORK, AND IN YOUR COMMUNITY, AND RESPONSIBILITIES AS A PARENT, CHILD, SPOUSE, EMPLOYEE, FRIEND, ETC) [ON A SCALE OF 1 (POOR) TO 5 (EXCELLENT)]?: VERY GOOD

## 2024-10-31 NOTE — DISCHARGE INSTRUCTIONS
Surgical Dressing and Incision Care- Post-Op Hip    Prineo (mesh & glue)        Aquacel       Prevena (Incisional vac) with Tegaderm        The surgical dressing covering your incision is determined by the quality of your skin and incision during surgery.      The type of dressing does NOT have an impact on or change your rehab.      I like to use an Aquacel dressing on top of the mesh & glue dressing to prevent any wear from clothing on the underlying mesh dressing/incision. The top dressing is not required but I find it makes things more comfortable. All the dressings are water resistant, and it is okay to shower with them but do not take a bath or soak them.    If the Aquacel dressing begins to peel off before one week or you have problems with it let us know, but you can place another dressing over the glue and mesh dressing if necessary. Do not remove the top dressing until 7 days after you are discharged from the hospital. If the Aquacel dressing on top is looking fine, you can just leave it alone until I see you at your post op. Remember: It is very important to not remove or interrupt the underlying glue and mesh dressing.      With the vac dressing (on the right above), I typically will use staples underneath and these staples will be removed at your post operative appointment. Staple removal can be uncomfortable but does not cause a lot of pain. The vac dressing machine usually turns off on its own at about 1 week unless you have one that is designed to last 14 days. If (or when) the vac turns off, gently peel back the adhesive film, and then place another regular dressing over top of the incision. If you are having any problems   with the vac (persistent alarm/making noises/not sucking down normally/etc.), call our office if during regular hours. If in doubt at all just leave the dressing part on and contact us. Sometimes these vacs can be finicky but even if it acts up, it doesn't typically cause long      Post-Operative Medications:    You will be discharged with pain medication(s) and a stool softener. Please follow the instructions regarding these medications as provided by your nurse at the hospital. Please note that most narcotic pain medications have side effects that may include nausea, vomiting, sedation, dizzy spells, and/or constipation. If you experience any of these side effects to a severe extent, you should contact our office.      If you are suffering from constipation following your surgery, you may try taking both a stool softener and laxative together. A high fiber diet, as well as adequate hydration is also advised. I recommend taking stool softeners while you are on narcotics to help prevent constipation.      You should stop taking your opiate pain medication whenever you feel you can. A good way to wean off the pain medication is to cut the doses in half or increase the time between doses. For example, if you are taking 1 tablet every 6 hours extend that time to every 8 hours, then every 12 hours and so on.      A major, yet preventable, complication of Orthopaedic Surgery is a blood clot (DVT). You have been provided with a prescription for ONE of the following to prevent a potential blood clot:   Aspirin 81 mg, to be taken twice a day, with food, for 35 days. Please note that this medication make cause an upset stomach or acid reflux.   OR   Eliquis 2.5 mg to be taken twice a day for 35 days.          Unless instructed otherwise by our office, the use of any non-steroid anti-inflammatory medications besides meloxicam including Aleve, Advil, Motrin, etc. should be avoided while taking your prescribed blood thinner to prevent bleeding. Unless otherwise instructed, you will take meloxicam daily.      You should restart all your prescription medications once discharged unless specifically instructed otherwise.      Herbal supplements may be restarted 2 weeks after surgery.      If you have been

## 2024-11-01 ENCOUNTER — ANESTHESIA EVENT (OUTPATIENT)
Dept: OPERATING ROOM | Age: 64
End: 2024-11-01
Payer: MEDICARE

## 2024-11-04 ENCOUNTER — HOSPITAL ENCOUNTER (OUTPATIENT)
Age: 64
Setting detail: OBSERVATION
Discharge: HOME HEALTH | End: 2024-11-05
Attending: STUDENT IN AN ORGANIZED HEALTH CARE EDUCATION/TRAINING PROGRAM | Admitting: STUDENT IN AN ORGANIZED HEALTH CARE EDUCATION/TRAINING PROGRAM
Payer: MEDICARE

## 2024-11-04 ENCOUNTER — APPOINTMENT (OUTPATIENT)
Dept: GENERAL RADIOLOGY | Age: 64
End: 2024-11-04
Attending: STUDENT IN AN ORGANIZED HEALTH CARE EDUCATION/TRAINING PROGRAM
Payer: MEDICARE

## 2024-11-04 ENCOUNTER — ANESTHESIA (OUTPATIENT)
Dept: OPERATING ROOM | Age: 64
End: 2024-11-04
Payer: MEDICARE

## 2024-11-04 ENCOUNTER — APPOINTMENT (OUTPATIENT)
Dept: ULTRASOUND IMAGING | Age: 64
End: 2024-11-04
Attending: STUDENT IN AN ORGANIZED HEALTH CARE EDUCATION/TRAINING PROGRAM
Payer: MEDICARE

## 2024-11-04 DIAGNOSIS — Z96.649 STATUS POST HIP REPLACEMENT, UNSPECIFIED LATERALITY: Primary | ICD-10-CM

## 2024-11-04 PROBLEM — Z96.641 S/P HIP REPLACEMENT, RIGHT: Status: ACTIVE | Noted: 2024-11-04

## 2024-11-04 PROCEDURE — 6370000000 HC RX 637 (ALT 250 FOR IP): Performed by: STUDENT IN AN ORGANIZED HEALTH CARE EDUCATION/TRAINING PROGRAM

## 2024-11-04 PROCEDURE — 2580000003 HC RX 258: Performed by: ANESTHESIOLOGY

## 2024-11-04 PROCEDURE — 2580000003 HC RX 258: Performed by: STUDENT IN AN ORGANIZED HEALTH CARE EDUCATION/TRAINING PROGRAM

## 2024-11-04 PROCEDURE — C1776 JOINT DEVICE (IMPLANTABLE): HCPCS | Performed by: STUDENT IN AN ORGANIZED HEALTH CARE EDUCATION/TRAINING PROGRAM

## 2024-11-04 PROCEDURE — 97530 THERAPEUTIC ACTIVITIES: CPT

## 2024-11-04 PROCEDURE — 6360000002 HC RX W HCPCS: Performed by: ANESTHESIOLOGY

## 2024-11-04 PROCEDURE — 7100000001 HC PACU RECOVERY - ADDTL 15 MIN: Performed by: STUDENT IN AN ORGANIZED HEALTH CARE EDUCATION/TRAINING PROGRAM

## 2024-11-04 PROCEDURE — 2709999900 HC NON-CHARGEABLE SUPPLY: Performed by: STUDENT IN AN ORGANIZED HEALTH CARE EDUCATION/TRAINING PROGRAM

## 2024-11-04 PROCEDURE — 6370000000 HC RX 637 (ALT 250 FOR IP): Performed by: NURSE PRACTITIONER

## 2024-11-04 PROCEDURE — 94760 N-INVAS EAR/PLS OXIMETRY 1: CPT

## 2024-11-04 PROCEDURE — 2720000010 HC SURG SUPPLY STERILE: Performed by: STUDENT IN AN ORGANIZED HEALTH CARE EDUCATION/TRAINING PROGRAM

## 2024-11-04 PROCEDURE — 6360000002 HC RX W HCPCS: Performed by: STUDENT IN AN ORGANIZED HEALTH CARE EDUCATION/TRAINING PROGRAM

## 2024-11-04 PROCEDURE — 73502 X-RAY EXAM HIP UNI 2-3 VIEWS: CPT

## 2024-11-04 PROCEDURE — 6360000002 HC RX W HCPCS: Performed by: PHYSICIAN ASSISTANT

## 2024-11-04 PROCEDURE — 97162 PT EVAL MOD COMPLEX 30 MIN: CPT

## 2024-11-04 PROCEDURE — G0378 HOSPITAL OBSERVATION PER HR: HCPCS

## 2024-11-04 PROCEDURE — 76942 ECHO GUIDE FOR BIOPSY: CPT

## 2024-11-04 PROCEDURE — C1713 ANCHOR/SCREW BN/BN,TIS/BN: HCPCS | Performed by: STUDENT IN AN ORGANIZED HEALTH CARE EDUCATION/TRAINING PROGRAM

## 2024-11-04 PROCEDURE — 0055T BONE SRGRY CMPTR CT/MRI IMAG: CPT | Performed by: STUDENT IN AN ORGANIZED HEALTH CARE EDUCATION/TRAINING PROGRAM

## 2024-11-04 PROCEDURE — 2580000003 HC RX 258: Performed by: PHYSICIAN ASSISTANT

## 2024-11-04 PROCEDURE — 3600000005 HC SURGERY LEVEL 5 BASE: Performed by: STUDENT IN AN ORGANIZED HEALTH CARE EDUCATION/TRAINING PROGRAM

## 2024-11-04 PROCEDURE — 3700000000 HC ANESTHESIA ATTENDED CARE: Performed by: STUDENT IN AN ORGANIZED HEALTH CARE EDUCATION/TRAINING PROGRAM

## 2024-11-04 PROCEDURE — 64447 NJX AA&/STRD FEMORAL NRV IMG: CPT | Performed by: ANESTHESIOLOGY

## 2024-11-04 PROCEDURE — 3600000015 HC SURGERY LEVEL 5 ADDTL 15MIN: Performed by: STUDENT IN AN ORGANIZED HEALTH CARE EDUCATION/TRAINING PROGRAM

## 2024-11-04 PROCEDURE — 27130 TOTAL HIP ARTHROPLASTY: CPT | Performed by: STUDENT IN AN ORGANIZED HEALTH CARE EDUCATION/TRAINING PROGRAM

## 2024-11-04 PROCEDURE — 97110 THERAPEUTIC EXERCISES: CPT

## 2024-11-04 PROCEDURE — 3700000001 HC ADD 15 MINUTES (ANESTHESIA): Performed by: STUDENT IN AN ORGANIZED HEALTH CARE EDUCATION/TRAINING PROGRAM

## 2024-11-04 PROCEDURE — 7100000000 HC PACU RECOVERY - FIRST 15 MIN: Performed by: STUDENT IN AN ORGANIZED HEALTH CARE EDUCATION/TRAINING PROGRAM

## 2024-11-04 DEVICE — TRIDENT II TRITANIUM CLUSTER 52E
Type: IMPLANTABLE DEVICE | Site: HIP | Status: FUNCTIONAL
Brand: TRIDENT II

## 2024-11-04 DEVICE — 6.5MM LOW PROFILE HEX SCREW 25MM
Type: IMPLANTABLE DEVICE | Site: HIP | Status: FUNCTIONAL
Brand: TRIDENT II

## 2024-11-04 DEVICE — CERAMIC V40 FEMORAL HEAD
Type: IMPLANTABLE DEVICE | Site: HIP | Status: FUNCTIONAL
Brand: BIOLOX

## 2024-11-04 DEVICE — COMPONENT TOT HIP CAPPED LNR POLYETH H2STRYKER] STRYKER CORP]: Type: IMPLANTABLE DEVICE | Site: HIP | Status: FUNCTIONAL

## 2024-11-04 DEVICE — HIP STEM - HIGH OFFSET
Type: IMPLANTABLE DEVICE | Site: HIP | Status: FUNCTIONAL
Brand: INSIGNIA

## 2024-11-04 DEVICE — TRIDENT X3 0 DEGREE POLYETHYLENE INSERT
Type: IMPLANTABLE DEVICE | Site: HIP | Status: FUNCTIONAL
Brand: TRIDENT X3 INSERT

## 2024-11-04 RX ORDER — SODIUM CHLORIDE 0.9 % (FLUSH) 0.9 %
5-40 SYRINGE (ML) INJECTION EVERY 12 HOURS SCHEDULED
Status: DISCONTINUED | OUTPATIENT
Start: 2024-11-04 | End: 2024-11-05 | Stop reason: HOSPADM

## 2024-11-04 RX ORDER — SODIUM CHLORIDE 0.9 % (FLUSH) 0.9 %
5-40 SYRINGE (ML) INJECTION EVERY 12 HOURS SCHEDULED
Status: DISCONTINUED | OUTPATIENT
Start: 2024-11-04 | End: 2024-11-04 | Stop reason: HOSPADM

## 2024-11-04 RX ORDER — BUPIVACAINE HYDROCHLORIDE 5 MG/ML
INJECTION, SOLUTION EPIDURAL; INTRACAUDAL PRN
Status: DISCONTINUED | OUTPATIENT
Start: 2024-11-04 | End: 2024-11-04 | Stop reason: HOSPADM

## 2024-11-04 RX ORDER — NALOXONE HYDROCHLORIDE 0.4 MG/ML
INJECTION, SOLUTION INTRAMUSCULAR; INTRAVENOUS; SUBCUTANEOUS PRN
Status: DISCONTINUED | OUTPATIENT
Start: 2024-11-04 | End: 2024-11-04 | Stop reason: HOSPADM

## 2024-11-04 RX ORDER — ASPIRIN 81 MG/1
81 TABLET ORAL 2 TIMES DAILY
Status: DISCONTINUED | OUTPATIENT
Start: 2024-11-04 | End: 2024-11-05 | Stop reason: HOSPADM

## 2024-11-04 RX ORDER — SODIUM CHLORIDE 0.9 % (FLUSH) 0.9 %
5-40 SYRINGE (ML) INJECTION PRN
Status: DISCONTINUED | OUTPATIENT
Start: 2024-11-04 | End: 2024-11-05 | Stop reason: HOSPADM

## 2024-11-04 RX ORDER — FENTANYL CITRATE 0.05 MG/ML
50 INJECTION, SOLUTION INTRAMUSCULAR; INTRAVENOUS EVERY 10 MIN PRN
Status: DISCONTINUED | OUTPATIENT
Start: 2024-11-04 | End: 2024-11-04 | Stop reason: HOSPADM

## 2024-11-04 RX ORDER — SODIUM CHLORIDE 9 MG/ML
INJECTION, SOLUTION INTRAVENOUS PRN
Status: DISCONTINUED | OUTPATIENT
Start: 2024-11-04 | End: 2024-11-04 | Stop reason: HOSPADM

## 2024-11-04 RX ORDER — KETOROLAC TROMETHAMINE 30 MG/ML
INJECTION, SOLUTION INTRAMUSCULAR; INTRAVENOUS PRN
Status: DISCONTINUED | OUTPATIENT
Start: 2024-11-04 | End: 2024-11-04 | Stop reason: HOSPADM

## 2024-11-04 RX ORDER — ONDANSETRON 2 MG/ML
4 INJECTION INTRAMUSCULAR; INTRAVENOUS
Status: DISCONTINUED | OUTPATIENT
Start: 2024-11-04 | End: 2024-11-04 | Stop reason: HOSPADM

## 2024-11-04 RX ORDER — MIDAZOLAM HYDROCHLORIDE 1 MG/ML
INJECTION, SOLUTION INTRAMUSCULAR; INTRAVENOUS
Status: DISCONTINUED | OUTPATIENT
Start: 2024-11-04 | End: 2024-11-04 | Stop reason: SDUPTHER

## 2024-11-04 RX ORDER — ASPIRIN 81 MG/1
81 TABLET, CHEWABLE ORAL 2 TIMES DAILY
Qty: 70 TABLET | Refills: 0 | Status: SHIPPED | OUTPATIENT
Start: 2024-11-04 | End: 2024-12-09

## 2024-11-04 RX ORDER — ACETAMINOPHEN 500 MG
1000 TABLET ORAL EVERY 8 HOURS
Qty: 84 TABLET | Refills: 0 | Status: SHIPPED | OUTPATIENT
Start: 2024-11-04 | End: 2024-11-18

## 2024-11-04 RX ORDER — OXYCODONE HYDROCHLORIDE 5 MG/1
5-10 TABLET ORAL EVERY 6 HOURS PRN
Qty: 56 TABLET | Refills: 0 | Status: SHIPPED | OUTPATIENT
Start: 2024-11-04 | End: 2024-11-11

## 2024-11-04 RX ORDER — SODIUM CHLORIDE 0.9 % (FLUSH) 0.9 %
5-40 SYRINGE (ML) INJECTION PRN
Status: DISCONTINUED | OUTPATIENT
Start: 2024-11-04 | End: 2024-11-04 | Stop reason: HOSPADM

## 2024-11-04 RX ORDER — METOCLOPRAMIDE HYDROCHLORIDE 5 MG/ML
10 INJECTION INTRAMUSCULAR; INTRAVENOUS
Status: DISCONTINUED | OUTPATIENT
Start: 2024-11-04 | End: 2024-11-04 | Stop reason: HOSPADM

## 2024-11-04 RX ORDER — PROPOFOL 10 MG/ML
INJECTION, EMULSION INTRAVENOUS
Status: DISCONTINUED | OUTPATIENT
Start: 2024-11-04 | End: 2024-11-04 | Stop reason: SDUPTHER

## 2024-11-04 RX ORDER — CELECOXIB 100 MG/1
200 CAPSULE ORAL ONCE
Status: DISCONTINUED | OUTPATIENT
Start: 2024-11-04 | End: 2024-11-04 | Stop reason: HOSPADM

## 2024-11-04 RX ORDER — ROPIVACAINE HYDROCHLORIDE 5 MG/ML
INJECTION, SOLUTION EPIDURAL; INFILTRATION; PERINEURAL
Status: COMPLETED | OUTPATIENT
Start: 2024-11-04 | End: 2024-11-04

## 2024-11-04 RX ORDER — ONDANSETRON 4 MG/1
4 TABLET, FILM COATED ORAL EVERY 8 HOURS PRN
Qty: 9 TABLET | Refills: 0 | Status: SHIPPED | OUTPATIENT
Start: 2024-11-04 | End: 2024-11-07

## 2024-11-04 RX ORDER — SODIUM CHLORIDE, SODIUM LACTATE, POTASSIUM CHLORIDE, CALCIUM CHLORIDE 600; 310; 30; 20 MG/100ML; MG/100ML; MG/100ML; MG/100ML
INJECTION, SOLUTION INTRAVENOUS
Status: DISCONTINUED | OUTPATIENT
Start: 2024-11-04 | End: 2024-11-04 | Stop reason: SDUPTHER

## 2024-11-04 RX ORDER — DEXAMETHASONE SODIUM PHOSPHATE 10 MG/ML
INJECTION INTRAMUSCULAR; INTRAVENOUS
Status: DISCONTINUED | OUTPATIENT
Start: 2024-11-04 | End: 2024-11-04 | Stop reason: SDUPTHER

## 2024-11-04 RX ORDER — ONDANSETRON 2 MG/ML
4 INJECTION INTRAMUSCULAR; INTRAVENOUS EVERY 6 HOURS PRN
Status: DISCONTINUED | OUTPATIENT
Start: 2024-11-04 | End: 2024-11-05 | Stop reason: HOSPADM

## 2024-11-04 RX ORDER — OXYCODONE HYDROCHLORIDE 5 MG/1
5 TABLET ORAL
Status: DISCONTINUED | OUTPATIENT
Start: 2024-11-04 | End: 2024-11-04 | Stop reason: HOSPADM

## 2024-11-04 RX ORDER — TRANEXAMIC ACID 650 MG/1
1950 TABLET ORAL
Status: DISCONTINUED | OUTPATIENT
Start: 2024-11-04 | End: 2024-11-04 | Stop reason: HOSPADM

## 2024-11-04 RX ORDER — OXYCODONE HYDROCHLORIDE 5 MG/1
5 TABLET ORAL EVERY 4 HOURS PRN
Status: DISCONTINUED | OUTPATIENT
Start: 2024-11-04 | End: 2024-11-05 | Stop reason: HOSPADM

## 2024-11-04 RX ORDER — DIPHENHYDRAMINE HYDROCHLORIDE 50 MG/ML
12.5 INJECTION INTRAMUSCULAR; INTRAVENOUS
Status: DISCONTINUED | OUTPATIENT
Start: 2024-11-04 | End: 2024-11-04 | Stop reason: HOSPADM

## 2024-11-04 RX ORDER — ACETAMINOPHEN 325 MG/1
650 TABLET ORAL EVERY 6 HOURS
Status: DISCONTINUED | OUTPATIENT
Start: 2024-11-04 | End: 2024-11-05 | Stop reason: HOSPADM

## 2024-11-04 RX ORDER — BUPIVACAINE HYDROCHLORIDE 7.5 MG/ML
INJECTION, SOLUTION INTRASPINAL
Status: DISCONTINUED | OUTPATIENT
Start: 2024-11-04 | End: 2024-11-04 | Stop reason: SDUPTHER

## 2024-11-04 RX ORDER — SODIUM CHLORIDE, SODIUM LACTATE, POTASSIUM CHLORIDE, CALCIUM CHLORIDE 600; 310; 30; 20 MG/100ML; MG/100ML; MG/100ML; MG/100ML
INJECTION, SOLUTION INTRAVENOUS CONTINUOUS
Status: DISCONTINUED | OUTPATIENT
Start: 2024-11-04 | End: 2024-11-05 | Stop reason: HOSPADM

## 2024-11-04 RX ORDER — DEXAMETHASONE SODIUM PHOSPHATE 10 MG/ML
10 INJECTION, SOLUTION INTRAMUSCULAR; INTRAVENOUS ONCE
Status: COMPLETED | OUTPATIENT
Start: 2024-11-04 | End: 2024-11-04

## 2024-11-04 RX ORDER — MELOXICAM 15 MG/1
15 TABLET ORAL DAILY
Qty: 28 TABLET | Refills: 0 | Status: ON HOLD | OUTPATIENT
Start: 2024-11-04 | End: 2024-11-04 | Stop reason: HOSPADM

## 2024-11-04 RX ORDER — SODIUM CHLORIDE 9 MG/ML
INJECTION, SOLUTION INTRAVENOUS PRN
Status: DISCONTINUED | OUTPATIENT
Start: 2024-11-04 | End: 2024-11-05 | Stop reason: HOSPADM

## 2024-11-04 RX ORDER — SODIUM CHLORIDE 9 MG/ML
INJECTION, SOLUTION INTRAMUSCULAR; INTRAVENOUS; SUBCUTANEOUS
Status: DISPENSED
Start: 2024-11-04 | End: 2024-11-05

## 2024-11-04 RX ORDER — ACETAMINOPHEN 500 MG
1000 TABLET ORAL ONCE
Status: COMPLETED | OUTPATIENT
Start: 2024-11-04 | End: 2024-11-04

## 2024-11-04 RX ORDER — DOCUSATE SODIUM 100 MG/1
100 CAPSULE, LIQUID FILLED ORAL 2 TIMES DAILY
Qty: 14 CAPSULE | Refills: 0 | Status: SHIPPED | OUTPATIENT
Start: 2024-11-04 | End: 2024-11-11

## 2024-11-04 RX ORDER — ONDANSETRON 4 MG/1
4 TABLET, ORALLY DISINTEGRATING ORAL EVERY 8 HOURS PRN
Status: DISCONTINUED | OUTPATIENT
Start: 2024-11-04 | End: 2024-11-05 | Stop reason: HOSPADM

## 2024-11-04 RX ORDER — OXYCODONE HYDROCHLORIDE 5 MG/1
10 TABLET ORAL EVERY 4 HOURS PRN
Status: DISCONTINUED | OUTPATIENT
Start: 2024-11-04 | End: 2024-11-05 | Stop reason: HOSPADM

## 2024-11-04 RX ORDER — KETOROLAC TROMETHAMINE 15 MG/ML
15 INJECTION, SOLUTION INTRAMUSCULAR; INTRAVENOUS EVERY 6 HOURS
Status: DISCONTINUED | OUTPATIENT
Start: 2024-11-04 | End: 2024-11-05 | Stop reason: HOSPADM

## 2024-11-04 RX ORDER — SODIUM CHLORIDE, SODIUM LACTATE, POTASSIUM CHLORIDE, CALCIUM CHLORIDE 600; 310; 30; 20 MG/100ML; MG/100ML; MG/100ML; MG/100ML
INJECTION, SOLUTION INTRAVENOUS CONTINUOUS
Status: DISCONTINUED | OUTPATIENT
Start: 2024-11-04 | End: 2024-11-04 | Stop reason: HOSPADM

## 2024-11-04 RX ORDER — SULFAMETHOXAZOLE AND TRIMETHOPRIM 800; 160 MG/1; MG/1
1 TABLET ORAL 2 TIMES DAILY
Qty: 14 TABLET | Refills: 0 | Status: SHIPPED | OUTPATIENT
Start: 2024-11-04 | End: 2024-11-11

## 2024-11-04 RX ORDER — SENNA AND DOCUSATE SODIUM 50; 8.6 MG/1; MG/1
1 TABLET, FILM COATED ORAL 2 TIMES DAILY
Status: DISCONTINUED | OUTPATIENT
Start: 2024-11-04 | End: 2024-11-05 | Stop reason: HOSPADM

## 2024-11-04 RX ORDER — ROPIVACAINE HYDROCHLORIDE 5 MG/ML
INJECTION, SOLUTION EPIDURAL; INFILTRATION; PERINEURAL
Status: COMPLETED
Start: 2024-11-04 | End: 2024-11-04

## 2024-11-04 RX ADMIN — SENNOSIDES AND DOCUSATE SODIUM 1 TABLET: 50; 8.6 TABLET ORAL at 20:29

## 2024-11-04 RX ADMIN — SODIUM CHLORIDE, POTASSIUM CHLORIDE, SODIUM LACTATE AND CALCIUM CHLORIDE: 600; 310; 30; 20 INJECTION, SOLUTION INTRAVENOUS at 13:28

## 2024-11-04 RX ADMIN — DEXAMETHASONE SODIUM PHOSPHATE 10 MG: 10 INJECTION INTRAMUSCULAR; INTRAVENOUS at 12:59

## 2024-11-04 RX ADMIN — CEFAZOLIN 2000 MG: 1 INJECTION, POWDER, FOR SOLUTION INTRAMUSCULAR; INTRAVENOUS at 17:56

## 2024-11-04 RX ADMIN — PROPOFOL 100 MCG/KG/MIN: 10 INJECTION, EMULSION INTRAVENOUS at 12:47

## 2024-11-04 RX ADMIN — DEXAMETHASONE SODIUM PHOSPHATE 10 MG: 10 INJECTION, SOLUTION INTRAMUSCULAR; INTRAVENOUS at 14:39

## 2024-11-04 RX ADMIN — ROPIVACAINE HYDROCHLORIDE 20 ML: 5 INJECTION, SOLUTION EPIDURAL; INFILTRATION; PERINEURAL at 12:25

## 2024-11-04 RX ADMIN — KETOROLAC TROMETHAMINE 15 MG: 15 INJECTION, SOLUTION INTRAMUSCULAR; INTRAVENOUS at 17:58

## 2024-11-04 RX ADMIN — ACETAMINOPHEN 1000 MG: 500 TABLET ORAL at 11:53

## 2024-11-04 RX ADMIN — MIDAZOLAM HYDROCHLORIDE 2 MG: 2 INJECTION, SOLUTION INTRAMUSCULAR; INTRAVENOUS at 12:22

## 2024-11-04 RX ADMIN — ACETAMINOPHEN 650 MG: 325 TABLET ORAL at 17:56

## 2024-11-04 RX ADMIN — PHENYLEPHRINE HYDROCHLORIDE 100 MCG: 10 INJECTION INTRAVENOUS at 13:18

## 2024-11-04 RX ADMIN — PROPOFOL 60 MG: 10 INJECTION, EMULSION INTRAVENOUS at 12:46

## 2024-11-04 RX ADMIN — TRANEXAMIC ACID 1950 MG: 650 TABLET ORAL at 11:54

## 2024-11-04 RX ADMIN — ASPIRIN 81 MG: 81 TABLET, COATED ORAL at 20:29

## 2024-11-04 RX ADMIN — SODIUM CHLORIDE, POTASSIUM CHLORIDE, SODIUM LACTATE AND CALCIUM CHLORIDE: 600; 310; 30; 20 INJECTION, SOLUTION INTRAVENOUS at 17:54

## 2024-11-04 RX ADMIN — PHENYLEPHRINE HYDROCHLORIDE 100 MCG: 10 INJECTION INTRAVENOUS at 13:29

## 2024-11-04 RX ADMIN — SODIUM CHLORIDE, POTASSIUM CHLORIDE, SODIUM LACTATE AND CALCIUM CHLORIDE: 600; 310; 30; 20 INJECTION, SOLUTION INTRAVENOUS at 11:30

## 2024-11-04 RX ADMIN — SODIUM CHLORIDE, POTASSIUM CHLORIDE, SODIUM LACTATE AND CALCIUM CHLORIDE: 600; 310; 30; 20 INJECTION, SOLUTION INTRAVENOUS at 12:41

## 2024-11-04 RX ADMIN — CEFAZOLIN 2000 MG: 2 INJECTION, POWDER, FOR SOLUTION INTRAMUSCULAR; INTRAVENOUS at 12:46

## 2024-11-04 RX ADMIN — SODIUM CHLORIDE, POTASSIUM CHLORIDE, SODIUM LACTATE AND CALCIUM CHLORIDE: 600; 310; 30; 20 INJECTION, SOLUTION INTRAVENOUS at 15:26

## 2024-11-04 RX ADMIN — BUPIVACAINE HYDROCHLORIDE IN DEXTROSE 1 ML: 7.5 INJECTION, SOLUTION SUBARACHNOID at 12:37

## 2024-11-04 ASSESSMENT — PAIN SCALES - GENERAL
PAINLEVEL_OUTOF10: 0

## 2024-11-04 ASSESSMENT — PAIN - FUNCTIONAL ASSESSMENT: PAIN_FUNCTIONAL_ASSESSMENT: 0-10

## 2024-11-04 NOTE — OP NOTE
Operative Note      Patient: Nora Eisenberg  YOB: 1960  MRN: [id number]    Date of Procedure: 11/4/2024    Pre-Op Diagnosis Codes:      * Primary osteoarthritis of right hip [M16.11]    Post-Op Diagnosis: Same       Procedure(s):  Right anterior total hip replacement with Todd    Surgeon(s):  Stan Clark MD    Assistant:   First Assistant: Negra Arcos    Anesthesia: Choice    Estimated Blood Loss (mL): 300     Complications: None    Specimens:   * No specimens in log *    Implants:  Implant Name Type Inv. Item Serial No.  Lot No. LRB No. Used Action   INSERT ACET E 0 DEG 36 MM HIP X3 TRIDENT - [id number]  INSERT ACET E 0 DEG 36 MM HIP X3 TRIDENT  [employer] [facility] [id number] Right 1 Implanted   SCREW BNE L25MM DIA6.5MM HEX LO PROF TRIDENT II - [id number]  SCREW BNE L25MM DIA6.5MM HEX LO [facility] TRIDENT II  [employer] [facility] [id number] Right 1 Implanted   SHELL ACET SZ E [id number] 5 CLUS H TRITANIUM PRESSFIT BRYON - [id number]  SHELL ACET SZ E [id number] 5 CLUS H TRITANIUM PRESSFIT BRYON  [employer] [facility] [id number] Right 1 Implanted   STEM FEM HI OFFSET 5 HIP CLLRD INSIGNIA - [id number]  STEM FEM HI OFFSET 5 HIP CLLRD INSIGNIA  [employer] [facility] [id number] Right 1 Implanted   HEAD FEM [id number] +0MM OFFSET HIP BIOLOX DELT CERAMIC TAPR - [id number]  HEAD FEM [id number] +0MM OFFSET HIP BIOLOX DELT CERAMIC TAPR  [employer] [facility] [id number] Right 1 Implanted         Drains: * No LDAs found *    Findings:  Infection Present At Time Of Surgery (PATOS) (choose all levels that have infection present):  No infection present  Other Findings: Right hip DJD    Detailed Description of Procedure:   SURGEON: Stan Clark M.D.    ASSISTANT: Negra Gillette    No qualified resident was available to assist during the case.  The assistant was needed for helping position the patient, holding  acetabular component was impacted into position targeting 40 degrees of abduction and 20 degrees of anteversion. Screw fixation was utilized to enhance acetabular component fixation. The acetabular liner was placed and impacted into position. The liner locking mechanism engaged. Osteophytes were removed. Attention was then turned to the femoral side.    With the femur rotated to 120 degrees without any traction, a retractor was placed medially to the calcar.  The superolateral capsular release was then performed to allow for delivering the femur up into the incision.  The hip was then placed in extension and adduction.  Retractor was then placed over the tip of the greater trochanter.  Femoral  preparation was started with a box osteotome and canal finder. The canal was sequentially broached to a stable fit. The final broach was impacted into position in approximately 10 degrees of anteversion, following the posterior calcar for assistance with version. The trial head was placed.  Retractors were removed.  The leg was then brought into neutral, and then traction was applied with internal rotation to reduce the hip.  Fluoroscopy used to check the offset and leg lengths referencing the [facility] CT templating. The hip stability was assessed. The hip was stable in extension and external rotation as well as in flexion, adduction of 20 degrees and internal rotation to 80 degrees. The hip was dislocated, the trial broach was removed, the canal irrigated with pulsatile lavage and dried, and the final stem was inserted in routine fashion. After placement of the stem, the trunnion was cleansed and dried and the modular head firmly impacted in place and the hip reduced. Again, leg length assessment and stability assessment of the hip were performed to confirm optimal component selection. The wound was irrigated with sterile dilute betadine solution and hemostasis obtained. The pericapsular injection was performed with the local

## 2024-11-04 NOTE — PROGRESS NOTES
Patient ID:  Nora Eisenberg  [id number]  64 y.o.  1960  Patient received in PACU BED 2 Report received from Anesthesia and Surgical Nurse.   Attached to Monitor, VSS, See Nursing Assessment and Flowsheets for detailed Information  Awake, following commands, answering questions appropriately. O2 Sats>92 on Room air  Patient has sensation mid calf from spinal.     Electronically signed by Cynthia Mckinney RN on 11/4/24

## 2024-11-04 NOTE — PROGRESS NOTES
Pt is in bed and upset that she does not have full feeling back to her leg. Pt encouraged to be positive and that what she is feeling is normal post op. Pt medicated with IV toradol and po tylenol. Iv antibiotic infusing, pt ate her dinner. Call light in reach.

## 2024-11-04 NOTE — ANESTHESIA POSTPROCEDURE EVALUATION
Department of Anesthesiology  Postprocedure Note    Patient: Nora Eisenberg  MRN: [id number]  YOB: 1960  Date of evaluation: 11/4/2024    Procedure Summary       Date: 11/04/24 Room / Location: [address]    Anesthesia Start: 1241 Anesthesia Stop: 1432    Procedure: Right anterior total hip replacement with Todd (Right) Diagnosis:       Primary osteoarthritis of right hip      (Primary osteoarthritis of right hip [M16.11])    Surgeons: Stan Clark MD Responsible Provider: Chacha Mcdowell MD    Anesthesia Type: MAC, regional, spinal ASA Status: 2            Anesthesia Type: No value filed.    Haider Phase I: Haider Score: 9    Haider Phase II:      Anesthesia Post Evaluation    Patient location during evaluation: PACU  Patient participation: complete - patient participated  Level of consciousness: awake and alert  Pain score: 0  Airway patency: patent  Nausea & Vomiting: no vomiting and no nausea  Cardiovascular status: hemodynamically stable  Respiratory status: nasal cannula  Hydration status: stable  Multimodal analgesia pain management approach  Pain management: adequate    No notable events documented.

## 2024-11-04 NOTE — ANESTHESIA PROCEDURE NOTES
Peripheral Block    Patient location during procedure: pre-op  Reason for block: post-op pain management and at surgeon's request  Start time: 11/4/2024 12:22 PM  End time: 11/4/2024 12:27 PM  Staffing  Performed: anesthesiologist   Anesthesiologist: Chacha Mcdowell MD  Performed by: Chacha Mcdowell MD  Authorized by: Chacha Mcdowell MD    Preanesthetic Checklist  Completed: patient identified, IV checked, site marked, risks and benefits discussed, surgical/procedural consents, equipment checked, pre-op evaluation, timeout performed, anesthesia consent given, oxygen available and monitors applied/VS acknowledged  Peripheral Block   Patient position: supine  Prep: ChloraPrep  Provider prep: mask and sterile gloves (Sterile probe cover)  Patient monitoring: cardiac monitor, continuous pulse ox, frequent blood pressure checks and IV access  Block type: PENG  Laterality: right  Injection technique: single-shot  Guidance: ultrasound guided  Local infiltration: lidocaine  Infiltration strength: 1 %  Local infiltration: lidocaine  Dose: 1 mL    Needle   Needle type: combined needle/nerve stimulator   Needle gauge: 21 G  Needle localization: anatomical landmarks and ultrasound guidance  Needle length: 10 cm  Assessment   Injection assessment: negative aspiration for heme, no paresthesia on injection and local visualized surrounding nerve on ultrasound  Paresthesia pain: immediately resolved  Slow fractionated injection: yes  Hemodynamics: stable    Additional Notes  Ultrasound guidance used to view needle for placement.    Ultrasound image stored,  printed and saved in patient chart.    Sterile probe cover used    Medications Administered  ropivacaine (NAROPIN) injection 0.5% - Perineural   20 mL - 11/4/2024 12:25:00 PM

## 2024-11-04 NOTE — PROGRESS NOTES
Good  Sitting - Dynamic: Good;- (impulsive \"I know how to do it.\")  Standing - Static: Fair;- (FWW)  Standing - Dynamic: Poor (FWW)  Exercise Treatment: AP x10, GS x 10, AAROM heelsides, hip abd/add, and SLR x 5 resps to warm up before ambualtion attempt postop 11/4/24     Goals  Short Term Goals  Time Frame for Short Term Goals: 3-5 days if needed medically postop R VIPUL- anterior  Short Term Goal 1: bed mobility modified independent  Short Term Goal 2: Transfers sit to stand,stand to sit and bed to chair with FWW asnd <= CS, WBAT RLE  Short Term Goal 3: Amb>= 50 ft with FWW , WBAT RLE before fatigued with <= close supervision  Short Term Goal 4: 2 stairs with door frame wtih <= CGA of one, marked time  Short Term Goal 5: Toelrate x 10 KLE SANTOSH for strength and ROM, HEP handout post R VIPUL  in place for discharge  Long Term Goals  Time Frame for Long Term Goals : same as STG postop pt  Patient Goals   Patient Goals : \"I need to get around and go home.\"     Pt given additional R hip ice pack and warm blanket post eval and tx.     Education  Patient Education  Education Given To: Patient  Education Provided: Role of Therapy;Plan of Care;Equipment  Education Provided Comments: FWW safety- min to mod vc for safe technique      Therapy Time   Individual Concurrent Group Co-treatment   Time In  425         Time Out  525         Minutes  60                 Tierney Slater, [id number]

## 2024-11-04 NOTE — PROGRESS NOTES
1216 Dr. Mcdowell anesthesia at bedside. Timeout completed for spinal and R CHADD block with Dr. Mcdowell, the patient and this RN.  1226 VS obtained see EMR.  1237 Procedure complete. Pt ruthy well. VS obtained see EMR.

## 2024-11-04 NOTE — PROGRESS NOTES
5114 Dr Clark at bedside reviewed consent and pre-op medications with patient. Patient does not want to take celebrex, Dr. Clark stated that is OK, no celebrex.

## 2024-11-04 NOTE — PROGRESS NOTES
[facility]ach[facility] Rabago Initial Discharge Assessment    Met with Patient to discuss discharge plan.        PCP: José Miguel Zhu, DO                                  Date of Last Visit: \"last month\"    If no PCP, list provided? N/A    Discharge Planning    Living Arrangements: independently at home    Who do you live with? self    Who helps you with your care:  self- However, Patient identifies adult son and daughter as supportive and able to assist with care needs prn.  Patient reports that children reside near patient and check in on patient periodically    If lives at home:     Do you have any barriers navigating in your home? no    Patient can perform ADL?  Yes    Current Services (outpatient and in home) :  None    Dialysis: No    Is transportation available to get to your appointments? Yes    DME Equipment:  yes - cane, walker, shower chair, grab bars, and sock aid kit.  Patient identifies no DME needs at this time     Respiratory equipment: None    Respiratory provider:  no     Pharmacy:  yes - [email or web address] in [place] on [address]     Able to afford cost of medication: Yes    Does patient feel safe at home? Yes    Does patient identify any home going needs? Yes, Skilled HHC     [facility]     Patient agreeable to SNF/Rehab?     N/A    Other discharge needs identified?      Patient identifies no further DC needs beyond HHC and prn assist from family    Was Freedom of Choice Provided?      Yes    Initial Discharge Plan? (Note: please see concurrent daily documentation for any updates after initial note).      Chart reviewed.  Patient was admitted to [facility] under observation status post right total hip replacement.  Patient was evaluated by PT and recommending HHC and prn assist upon DC.  This  met with patient to discuss DC planning and help at home needs.  Upon visit patient is alert and laying in hospital bed with head of bed elevated.  Patient appears well groomed

## 2024-11-04 NOTE — H&P
History and physical is reviewed, patient re evaluated, no changes.  Procedure, risks, benefits, and postoperative course were discussed with the patient and consent is reviewed.    Dr. Clark

## 2024-11-04 NOTE — PROGRESS NOTES
Patient arrived from Pacu, pt is alert and oriented. She states she had her left hip replaced last year and it went well. She states she is not currently in pain but when she had her other hip done she was medicated with IV pain medicine, she was not aware of the name of it but it was not morphine. She states that the IV medicine helped her pain so much that she had no pain even at home. Pt states she lives alone and her son comes over 3 times a day and she is wanting to be discharged tomorrow with HHC and therapy at home. Pt is insisting on having leg compression wraps as well. Pt has a call light in reach. Warm blankets for comfort. IVF's infusing without issue.

## 2024-11-04 NOTE — PROGRESS NOTES
Patient adamantly refused Incentive Spirometer stating she doesn't need it and  she is breathing just fine. Offered to leave at bedside, patient declined.    Electronically signed by Barbra Romero RCP on 11/4/2024 at 6:50 PM

## 2024-11-04 NOTE — ANESTHESIA PROCEDURE NOTES
Spinal Block    Patient location during procedure: pre-op  End time: 11/4/2024 12:37 PM  Reason for block: primary anesthetic  Staffing  Performed: anesthesiologist   Anesthesiologist: Chacha Mcdowell MD  Performed by: Chacha Mcdowell MD  Authorized by: Chacha Mcdowell MD    Spinal Block  Patient position: sitting  Prep: Betadine  Patient monitoring: cardiac monitor, continuous pulse ox and frequent blood pressure checks  Approach: midline  Location: L4/L5  Provider prep: mask and sterile gloves  Local infiltration: lidocaine  Needle  Needle type: Pencan   Needle gauge: 24 G  Needle length: 3.5 in  Assessment  Sensory level: T6  Events: cerebrospinal fluid  Lysis level: CSF aspirated.  CSF: clear  Attempts: 2  Hemodynamics: stable  Additional Notes  Free flowing CSF.  Negative heme.  Negative paresthesia.  .  Preanesthetic Checklist  Completed: patient identified, IV checked, site marked, risks and benefits discussed, surgical/procedural consents, equipment checked, pre-op evaluation, timeout performed, anesthesia consent given, oxygen available and monitors applied/VS acknowledged

## 2024-11-04 NOTE — PROGRESS NOTES
Patient seen and examined post operatively.    Doing well, pain well controlled. Has had some residual numbness/weakness in legs secondary to spinal. Tolerating PO. Denies F/C/N/V/SOB/CP.    Awake, alert  Pleasant mood  Nonlabored breathing  Operative dressings C/D/I  Mild expected post operative swelling  Distally NVI, no sign of DVT    S/P: Right total hip replacement with Todd Robotic assistance, doing well     -Continue to mobilize with PT/OT, full weightbearing RLE, walker for assist.  Mepilex dressing to remain in place until outpatient follow up  -VTE prophylaxis:  Chemoprophylaxis with ASA 81 mg BID.  Mechanical prophylaxis to incorporate SCDs and early ambulation.  -GI prophylaxis:  PPI, stool softener.  -Pain control:  Scheduled acetaminophen and toradol, intermittent oxycodone PRN  -Disposition:  Plan for discharge to home with home PT, likely tomorrow. Post op instructions are in chart and medications sent to pharmacy. We discontinued the omeprazole and meloxicam at her request due to GI intolerances.    Dr. Clark

## 2024-11-04 NOTE — ANESTHESIA PRE PROCEDURE
Department of Anesthesiology  Preprocedure Note       Name:  Nora Eisenberg   Age:  64 y.o.  :  1960                                          MRN:  [id number]         Date:  2024      Surgeon: Surgeon(s):  Stan Clark MD    Procedure: Procedure(s):  Right anterior total hip replacement with Todd robotic assistance Case Comments/Implants: [place] table, large C arm, 2 assistants, Todd Anesthesia Requested: Choice Vendor Information: [place]    Medications prior to admission:   Prior to Admission medications    Medication Sig Start Date End Date Taking? Authorizing Provider   acetaminophen (TYLENOL) 500 MG tablet Take 2 tablets by mouth every 8 (eight) hours for 14 days For post operative pain control 24 Yes Stan Clark MD   aspirin 81 MG chewable tablet Take 1 tablet by mouth 2 times daily For blood clot prevention 24 Yes Stan Clark MD   sulfamethoxazole-trimethoprim (BACTRIM DS) 800-160 MG per tablet Take 1 tablet by mouth 2 times daily for 7 days For infection prevention 24 Yes Stan Clark MD   docusate sodium (COLACE) 100 MG capsule Take 1 capsule by mouth 2 times daily for 7 days For constipation prevention 24 Yes Stan Clark MD   oxyCODONE (ROXICODONE) 5 MG immediate release tablet Take 1-2 tablets by mouth every 6 hours as needed for Pain (Take 1 tablet for moderate pain or take 2 tablets for severe pain. Do not take if you do not need it.) for up to 7 days. For post operative pain control that is not relieved with other medications or ice therapy Max Daily Amount: 40 mg 24 Yes Stan Clark MD   chlorhexidine gluconate (HIBICLENS) 4 % SOLN external solution Use daily for 3 days prior to surgery 10/29/24  Yes David Julian PA-C   Probiotic Product (PROBIOTIC-10 ULTIMATE) CAPS Take 1 capsule by mouth   Yes ProviderSabine MD   Cholecalciferol 50 MCG (2000 UT) TABS Take 50 mcg by mouth daily   Yes

## 2024-11-05 VITALS
WEIGHT: 155 LBS | TEMPERATURE: 98.2 F | HEIGHT: 67 IN | RESPIRATION RATE: 16 BRPM | DIASTOLIC BLOOD PRESSURE: 71 MMHG | BODY MASS INDEX: 24.33 KG/M2 | HEART RATE: 84 BPM | OXYGEN SATURATION: 98 % | SYSTOLIC BLOOD PRESSURE: 105 MMHG

## 2024-11-05 PROBLEM — M16.9 HIP OSTEOARTHRITIS: Status: ACTIVE | Noted: 2024-11-05

## 2024-11-05 PROCEDURE — G0378 HOSPITAL OBSERVATION PER HR: HCPCS

## 2024-11-05 PROCEDURE — 99024 POSTOP FOLLOW-UP VISIT: CPT | Performed by: STUDENT IN AN ORGANIZED HEALTH CARE EDUCATION/TRAINING PROGRAM

## 2024-11-05 PROCEDURE — 97530 THERAPEUTIC ACTIVITIES: CPT

## 2024-11-05 PROCEDURE — 97116 GAIT TRAINING THERAPY: CPT

## 2024-11-05 PROCEDURE — 97110 THERAPEUTIC EXERCISES: CPT

## 2024-11-05 PROCEDURE — 6360000002 HC RX W HCPCS: Performed by: STUDENT IN AN ORGANIZED HEALTH CARE EDUCATION/TRAINING PROGRAM

## 2024-11-05 PROCEDURE — 97165 OT EVAL LOW COMPLEX 30 MIN: CPT

## 2024-11-05 PROCEDURE — 96374 THER/PROPH/DIAG INJ IV PUSH: CPT

## 2024-11-05 RX ADMIN — ONDANSETRON 4 MG: 2 INJECTION, SOLUTION INTRAMUSCULAR; INTRAVENOUS at 03:17

## 2024-11-05 ASSESSMENT — PAIN DESCRIPTION - LOCATION: LOCATION: HIP

## 2024-11-05 ASSESSMENT — PAIN SCALES - GENERAL: PAINLEVEL_OUTOF10: 0

## 2024-11-05 ASSESSMENT — PAIN DESCRIPTION - ORIENTATION: ORIENTATION: RIGHT

## 2024-11-05 NOTE — PROGRESS NOTES
Patient seen and examined in her room this AM      Doing well, pain well controlled. Her strength in her legs has returned. She has been up to use the bathroom. Tolerating PO. Denies F/C/N/V/SOB/CP.     Awake, alert  Pleasant mood  Nonlabored breathing  Operative dressings C/D/I  Mild expected post operative swelling  Distally NVI, no sign of DVT    Post op xrays were reviewed and demonstrate satisfactory alignment  No indication for labs this AM     S/P: POD1 s/p Right total hip replacement with Todd Robotic assistance, doing well      -Continue to mobilize with PT/OT, full weightbearing RLE, walker for assist.  Mepilex dressing to remain in place until outpatient follow up  -VTE prophylaxis:  Chemoprophylaxis with ASA 81 mg BID.  Mechanical prophylaxis to incorporate SCDs and early ambulation.  -GI prophylaxis:  PPI, stool softener.  -Pain control:  Scheduled acetaminophen and toradol, intermittent oxycodone PRN  -Disposition:  Plan for discharge to home with home PT, likely today. Post op instructions are in chart and medications sent to pharmacy. We discontinued the omeprazole and meloxicam at her request due to GI intolerances.  -Follow up in office on 11/25/24 as previously scheduled     Dr. Clark

## 2024-11-05 NOTE — PROGRESS NOTES
Morning assessment complete, no complaints of pain, refusing asa and stool softener. Stated she was ready to go and will be discharging around 10:00/11:00.

## 2024-11-05 NOTE — PROGRESS NOTES
Pt A&Ox4 in bed watching television. Assessment and vitals are as charted. Pt refused to use bedside commode, assisted with using bedpan. Pt given ice bags per request. Pt states no further needs at this time. Call light and table are within reach. Bed alarm on for safety.

## 2024-11-05 NOTE — PROGRESS NOTES
13:00 D/C instructions given to pt, verbalized understanding. Escorted pt to auto via W/C with all belongings and D/C to home via auto per pt's son.

## 2024-11-05 NOTE — DISCHARGE SUMMARY
Discharge summary  This patient Nora Eisenberg was admitted to the hospital on 11/4/2024  after undergoing Procedure(s) (LRB):  Right anterior total hip replacement with Todd (Right) without complications that morning.    During the postoperative period,while in hospital, patient was medically managed by the hospitalist. Please see medial notes and H&P for patients additional diagnoses.  Ortho agrees with all medical diagnoses and treatments while patient in hospital.  No significant or unexpected findings or abnormal ortho imaging were noted during the hospital stay    Hospital course  Patient tolerated surgical procedure well and there was no complications. Patient progressed adequtly through their recovery during hospital stay including PT and rehabilitation.  DVT prophylaxis was implemented POD#1  Patient was then D/C on  to Home  in stable condition.  Patient was instructed on the use of pain medications, the signs and symptoms of infection, and was given our number to call should they have any questions or concerns following discharge.

## 2024-11-05 NOTE — CARE COORDINATION
This LSW spoke with Azalea at [facility], they are able to accept patient for services.  Electronically signed by FORTINO Tamayo on 11/5/24 at 9:33 AM EST

## 2024-11-05 NOTE — PROGRESS NOTES
Pt refusing last dose of Ancef. Educated patient on this medication. Pt verbalized understanding. Pt assisted with walker to the restroom and back to bed. Pt states she is feeling a little nauseated and would like zofran. Pt medicated per prn order. Pt also given saltines per request. Call light and table are within reach. Bed alarm on for safety.

## 2024-11-05 NOTE — PROGRESS NOTES
Physical Therapy  Facility/Department: [facility] MED SURG UNIT  Daily Treatment Note  NAME: Nora Eisenberg  : 1960  MRN: [id number]    Date of Service: 2024    Discharge Recommendations:  (P) Home with assist PRN, Home with Home health PT        Patient Diagnosis(es):     Assessment  Assessment: (P) Pt. tolerates well with gait distance and stair training; however, pt. demo's decrease tolerance to follow vc's to assist with gait pattern and safe stair sequence. Pt. stedy without LOB. Pt. tianna to complete safe controlled tsf and bed mobility skills with modified technique. Pt. tolerates 10 reps of LE ther ex with available planes of motion. HEP handout educated and issued.  Activity Tolerance: (P) Patient tolerated treatment well;Patient limited by pain;Other (comment) (Limited tolerance to follow vc's on technique and safety awareness. Pt. very talkative with less focus on task.)    Plan  Physical Therapy Plan  General Plan: (P) 5-7 times per week (1-2)  Current Treatment Recommendations: (P) Strengthening;ROM;Balance training;Functional mobility training;Transfer training;Stair training;Gait training;Pain management;Home exercise program;Patient/Caregiver education & training;Equipment evaluation, education, & procurement;Modalities;Therapeutic activities;Endurance training    Restrictions  Restrictions/Precautions  Restrictions/Precautions: Surgical Protocols  Required Braces or Orthoses?: No  Implants present? :  (old L VIPUL 11/10/23, new R VIPUL REED 24)  Position Activity Restriction  Hip Precautions: Anterior hip precautions  Other position/activity restrictions: no excessive extension     Subjective   Subjective  Subjective: (P) Pt. reports 3 to 4/10 R hip pain and agrees to therapy.  Pain: 4/10 R hip  Orientation  Overall Orientation Status: Within Functional Limits  Orientation Level: Oriented X4  Cognition  Overall Cognitive Status: WFL  Cognition Comment: Alert, pleasant,  Status: Within Functional Limits  Orientation Level: Oriented X4  Cognition  Overall Cognitive Status: WFL  Cognition Comment: Alert, pleasant, cooperative    Objective  Vitals     Bed Mobility Training  Bed Mobility Training: Yes  Overall Level of Assistance: Stand-by assistance  Supine to Sit: Stand-by assistance  Sit to Supine: Stand-by assistance  Scooting: Stand-by assistance  Balance  Sitting: Intact  Standing: Impaired;With support (With FWW SBA)  Transfer Training  Transfer Training: Yes  Overall Level of Assistance: Stand-by assistance  Interventions: (P) Safety awareness training;Verbal cues  Sit to Stand: Stand-by assistance  Stand to Sit: (P) Stand-by assistance  Bed to Chair: Stand-by assistance  Toilet Transfer: Stand-by assistance  Gait Training  Gait Training: Yes  Gait  Gait Training: (P) Yes  Overall Level of Assistance: (P) Stand-by assistance  Assistive Device: (P) Walker, rolling  Distance: 200'x2  Interventions: (P) Safety awareness training;Verbal cues  Speed/Esther: (P) Slow  Step Length: (P) Left shortened  Stance: (P) Right decreased  Gait Abnormalities: (P) Step to gait (Pt. demo's 3 point gait pattern and able to progress for  towards step through gait pattern following vc's; however, limited tolerance demo'd for 2 point gait pattern.)  Rail Use: (P) Right (SPC with L UE support.)  Stairs - Level of Assistance: (P) Contact-guard assistance;Stand-by assistance  Number of Stairs Trained: (P) 5 (non recipical. Edcuated to ascend leading with L LE and descend leading with R LE to reduce risk of incident.)     PT Exercises  A/AROM Exercises: (P) Standing ther ex supported: Heel raises x 10', Toe raises x 10', Hamstring curls x 10', Hip flexion x 10', Hip abduction x 10', mini squat x 10'. HEP handout educated and issued to benefit with ROM and strength.     Safety Devices  Type of Devices: All fall risk precautions in place;Call light within reach;Gait belt;Left in chair      Goals  Short

## 2024-11-05 NOTE — PROGRESS NOTES
Facility/Department: [facility] MED SURG UNIT  Occupational Therapy Initial Assessment    Name: Nora Eisenberg  : 1960  MRN: [id number]  Date of Service: 2024    Discharge Recommendations:  Continue to assess pending progress, Home with Home health OT, Home with assist PRN          Patient Diagnosis(es): R VIPUL  Past Medical History:  has a past medical history of History of TB (tuberculosis) and Hyperlipidemia.  Past Surgical History:  has a past surgical history that includes ovarian cyst removal.    Treatment Diagnosis: Decreased ADL/IADL performance d/t R VIPUL      Assessment  Performance deficits / Impairments: Decreased functional mobility ;Decreased endurance;Decreased ADL status;Decreased balance;Decreased safe awareness;Decreased high-level IADLs  Assessment: Pt is a 63 y/o F admitted to [facility] for elective R VIPUL, pt had L hip replaced last year, PLOF pt is from home with dtr in 2 level home, reports son will stay also at d/c to help with any needs. Upon OT arrival, pt already completed a sponge bath and was in the process of putting on her clothes. Brief eval completed prior to breakfast arrving, anticipate pt will d/c later today with [facility] then begin outpatient when ready. Pt in chair with all needs and x2 ice packs upon OT departure.  Treatment Diagnosis: Decreased ADL/IADL performance d/t R VIPUL  REQUIRES OT FOLLOW-UP: Yes  Activity Tolerance  Activity Tolerance: Patient limited by pain     Plan  Occupational Therapy Plan  Times Per Week: 1-2 visits post op  Times Per Day: Once a day  Current Treatment Recommendations: Strengthening, ROM, Balance training, Safety education & training, Self-Care / ADL, Functional mobility training, Patient/Caregiver education & training, Endurance training, Equipment evaluation, education, & procurement, Positioning, Pain management    Restrictions  Restrictions/Precautions  Restrictions/Precautions: Surgical Protocols  Required Braces or Orthoses?: No  Implants present? :

## 2024-11-05 NOTE — DISCHARGE INSTR - COC
kg/m²     Last documented pain score (0-10 scale): Pain Level: 0  Last Weight:   Wt Readings from Last 1 Encounters:   11/04/24 70.3 kg (155 lb)     Mental Status:  oriented, alert, coherent, logical, thought processes intact, and able to concentrate and follow conversation    IV Access:  - None    Nursing Mobility/ADLs:  Walking   Assisted  Transfer  Assisted  Bathing  Assisted  Dressing  Assisted  Toileting  Assisted  Feeding  Independent  Med Admin  Independent  Med Delivery   whole    Wound Care Documentation and Therapy:  Incision 11/04/24 Thigh Proximal;Right;Anterior (Active)   Dressing Status Clean;Dry;Intact 11/04/24 1545   Drainage Amount None (dry) 11/04/24 1545   Odor None 11/04/24 1545   Number of days: 0        Elimination:  Continence:   Bowel: Yes  Bladder: Yes  Urinary Catheter: None   Colostomy/Ileostomy/Ileal Conduit: No       Date of Last BM: ***    Intake/Output Summary (Last 24 hours) at 11/4/2024 2323  Last data filed at 11/4/2024 1420  Gross per 24 hour   Intake 1500 ml   Output --   Net 1500 ml     I/O last 3 completed shifts:  In: 1500 [I.V.:1400; IV Piggyback:100]  Out: -     Safety Concerns:     At Risk for Falls    Impairments/Disabilities:      None    Nutrition Therapy:  Current Nutrition Therapy:   - Oral Diet:  General    Routes of Feeding: Oral  Liquids: No Restrictions  Daily Fluid Restriction: no  Last Modified Barium Swallow with Video (Video Swallowing Test): not done    Treatments at the Time of Hospital Discharge:   Respiratory Treatments: ***  Oxygen Therapy:  is not on home oxygen therapy.  Ventilator:    - No ventilator support    Rehab Therapies: Physical Therapy and Occupational Therapy  Weight Bearing Status/Restrictions: No weight bearing restrictions  Other Medical Equipment (for information only, NOT a DME order):  walker  Other Treatments: ***    Patient's personal belongings (please select all that are sent with patient):  {[facility] DME Belongings:824754781}    NIKKIE  SIGNATURE:  Electronically signed by Jessica Ribera RN on 11/5/24 at 11:26 AM EST    CASE MANAGEMENT/SOCIAL WORK SECTION    Readmission Risk Assessment Score:  Readmission Risk              Risk of Unplanned Readmission:  0           Discharging to Facility/ Agency   Name: [facility]   Phone: [phone]  Fax: [phone]      / signature: Electronically signed by FORTINO Vogel on 11/4/24 at 11:23 PM EST    PHYSICIAN SECTION    Prognosis: {Prognosis:6558303032}    Condition at Discharge: { Patient Condition:535694457}    Rehab Potential (if transferring to Rehab): {Prognosis:9406907958}    Recommended Labs or Other Treatments After Discharge: ***    Physician Certification: I certify the above information and transfer of Nora Eisenberg  is necessary for the continuing treatment of the diagnosis listed and that she requires {Admit to Appropriate Level of Care:36401} for {GREATER/LESS:043698589} 30 days.     Update Admission H&P: {[facility] DME Changes in HandP:547052549}    PHYSICIAN SIGNATURE:  {Esignature:[id number]}

## 2024-11-25 ENCOUNTER — HOSPITAL ENCOUNTER (OUTPATIENT)
Dept: ORTHOPEDIC SURGERY | Age: 64
Discharge: HOME | End: 2024-11-27
Payer: MEDICARE

## 2024-11-25 ENCOUNTER — OFFICE VISIT (OUTPATIENT)
Dept: ORTHOPEDIC SURGERY | Age: 64
End: 2024-11-25

## 2024-11-25 VITALS
BODY MASS INDEX: 24.33 KG/M2 | WEIGHT: 155 LBS | TEMPERATURE: 97.1 F | HEART RATE: 52 BPM | OXYGEN SATURATION: 94 % | HEIGHT: 67 IN

## 2024-11-25 DIAGNOSIS — M16.11 PRIMARY OSTEOARTHRITIS OF RIGHT HIP: Primary | ICD-10-CM

## 2024-11-25 DIAGNOSIS — M16.11 PRIMARY OSTEOARTHRITIS OF RIGHT HIP: ICD-10-CM

## 2024-11-25 PROCEDURE — 73502 X-RAY EXAM HIP UNI 2-3 VIEWS: CPT

## 2024-11-25 PROCEDURE — 99024 POSTOP FOLLOW-UP VISIT: CPT | Performed by: STUDENT IN AN ORGANIZED HEALTH CARE EDUCATION/TRAINING PROGRAM

## 2024-11-25 RX ORDER — MELOXICAM 15 MG/1
TABLET ORAL
COMMUNITY
Start: 2024-11-04

## 2024-12-04 ENCOUNTER — OFFICE VISIT (OUTPATIENT)
Dept: ORTHOPEDIC SURGERY | Age: 64
End: 2024-12-04

## 2024-12-04 VITALS
HEART RATE: 92 BPM | HEIGHT: 67 IN | WEIGHT: 155 LBS | TEMPERATURE: 97.5 F | BODY MASS INDEX: 24.33 KG/M2 | OXYGEN SATURATION: 98 %

## 2024-12-04 DIAGNOSIS — L29.9 ITCHING: Primary | ICD-10-CM

## 2025-01-13 ENCOUNTER — OFFICE VISIT (OUTPATIENT)
Dept: ORTHOPEDIC SURGERY | Age: 65
End: 2025-01-13

## 2025-01-13 VITALS
BODY MASS INDEX: 24.33 KG/M2 | OXYGEN SATURATION: 98 % | TEMPERATURE: 97.6 F | HEIGHT: 67 IN | HEART RATE: 92 BPM | WEIGHT: 155 LBS

## 2025-01-13 DIAGNOSIS — Z96.641 S/P HIP REPLACEMENT, RIGHT: Primary | ICD-10-CM

## 2025-01-13 PROCEDURE — 99024 POSTOP FOLLOW-UP VISIT: CPT | Performed by: STUDENT IN AN ORGANIZED HEALTH CARE EDUCATION/TRAINING PROGRAM

## 2025-01-13 NOTE — PROGRESS NOTES
Nora follows up status post right anterior total hip replacement.  This was performed at the beginning of November.  She reports she is doing well overall.  She wanted me to take a look at her incision today.  She has had a couple areas where there have been little white thread sticking out.  I took a look at the incision it looks like she is spitting out a couple of the Vicryl stitches.  There is no sign of any infection.  With her consent I removed 2 Vicryl stitches  that were popping out.  The area was thoroughly cleansed with Betadine and alcohol.  There is no purulence.  No sign of erythema around the area. Doesn't probe deep at all.  I have recommended that she apply a Band-Aid over the 2 areas.  She should change this at least once daily.  Reviewed with her that this will need to granulate in and heal from the bottom up.  She has excellent range of motion of her hip without any significant pain.  She ambulates without assistive device.  She has good abductor strength and hip flexor strength.  She is distally neurovascular intact.  I will plan to see her back for her previously scheduled follow-up which will be in March.  She should have updated x-rays of the right hip at that time.    Dr. Clark

## 2025-04-02 ENCOUNTER — OFFICE VISIT (OUTPATIENT)
Age: 65
End: 2025-04-02
Payer: MEDICARE

## 2025-04-02 ENCOUNTER — HOSPITAL ENCOUNTER (OUTPATIENT)
Dept: ORTHOPEDIC SURGERY | Age: 65
Discharge: HOME | End: 2025-04-04
Payer: MEDICARE

## 2025-04-02 VITALS
TEMPERATURE: 97 F | RESPIRATION RATE: 16 BRPM | HEIGHT: 67 IN | BODY MASS INDEX: 22.76 KG/M2 | HEART RATE: 78 BPM | OXYGEN SATURATION: 98 % | WEIGHT: 145 LBS

## 2025-04-02 DIAGNOSIS — Z96.641 S/P HIP REPLACEMENT, RIGHT: ICD-10-CM

## 2025-04-02 DIAGNOSIS — Z96.641 S/P HIP REPLACEMENT, RIGHT: Primary | ICD-10-CM

## 2025-04-02 PROCEDURE — 1123F ACP DISCUSS/DSCN MKR DOCD: CPT | Performed by: STUDENT IN AN ORGANIZED HEALTH CARE EDUCATION/TRAINING PROGRAM

## 2025-04-02 PROCEDURE — 1090F PRES/ABSN URINE INCON ASSESS: CPT | Performed by: STUDENT IN AN ORGANIZED HEALTH CARE EDUCATION/TRAINING PROGRAM

## 2025-04-02 PROCEDURE — G8420 CALC BMI NORM PARAMETERS: HCPCS | Performed by: STUDENT IN AN ORGANIZED HEALTH CARE EDUCATION/TRAINING PROGRAM

## 2025-04-02 PROCEDURE — G8400 PT W/DXA NO RESULTS DOC: HCPCS | Performed by: STUDENT IN AN ORGANIZED HEALTH CARE EDUCATION/TRAINING PROGRAM

## 2025-04-02 PROCEDURE — 73502 X-RAY EXAM HIP UNI 2-3 VIEWS: CPT

## 2025-04-02 PROCEDURE — 99213 OFFICE O/P EST LOW 20 MIN: CPT | Performed by: STUDENT IN AN ORGANIZED HEALTH CARE EDUCATION/TRAINING PROGRAM

## 2025-04-02 PROCEDURE — 3017F COLORECTAL CA SCREEN DOC REV: CPT | Performed by: STUDENT IN AN ORGANIZED HEALTH CARE EDUCATION/TRAINING PROGRAM

## 2025-04-02 PROCEDURE — 1159F MED LIST DOCD IN RCRD: CPT | Performed by: STUDENT IN AN ORGANIZED HEALTH CARE EDUCATION/TRAINING PROGRAM

## 2025-04-02 PROCEDURE — G8427 DOCREV CUR MEDS BY ELIG CLIN: HCPCS | Performed by: STUDENT IN AN ORGANIZED HEALTH CARE EDUCATION/TRAINING PROGRAM

## 2025-04-02 PROCEDURE — 1036F TOBACCO NON-USER: CPT | Performed by: STUDENT IN AN ORGANIZED HEALTH CARE EDUCATION/TRAINING PROGRAM

## 2025-04-02 NOTE — PROGRESS NOTES
Subjective:      HPI:: Nora Eisenberg is a 64 y.o. female who presents today for follow-up status post right total hip replacement.  This was performed back in November.  She reports she is doing well overall.  Occasionally she gets some pain to the anterior lateral hip.  Seems to be worse after prolonged sitting and better with activity.  She denies any fevers, chills, wound issues.  She notes that she has been very active with work.    Past Medical History:   Diagnosis Date    History of TB (tuberculosis)     5/20/2014     Hyperlipidemia     3/3/2010      Past Surgical History:   Procedure Laterality Date    OVARIAN CYST REMOVAL      2005 : dermoid cyst left     TOTAL HIP ARTHROPLASTY Right 11/4/2024    Right anterior total hip replacement with Todd performed by Stan Clark MD at [facility] OR     Social History     Socioeconomic History    Marital status: Single     Spouse name: Not on file    Number of children: Not on file    Years of education: Not on file    Highest education level: Not on file   Occupational History    Not on file   Tobacco Use    Smoking status: Never    Smokeless tobacco: Not on file   Substance and Sexual Activity    Alcohol use: Yes     Alcohol/week: 0.0 standard drinks of alcohol     Comment: rarely    Drug use: No    Sexual activity: Yes     Partners: Male     Comment: none   Other Topics Concern    Not on file   Social History Narrative    Not on file     Social Drivers of Health     Financial Resource Strain: Low Risk  (11/10/2023)    Received from [facility]    Overall Financial Resource Strain (CARDIA)     Difficulty of Paying Living Expenses: Not hard at all   Food Insecurity: No Food Insecurity (11/4/2024)    Hunger Vital Sign     Worried About Running Out of Food in the Last Year: Never true     Ran Out of Food in the Last Year: Never true   Transportation Needs: No Transportation Needs (11/4/2024)    PRAPARE - Transportation     Lack of Transportation

## 2025-07-28 ENCOUNTER — OFFICE VISIT (OUTPATIENT)
Age: 65
End: 2025-07-28
Payer: MEDICARE

## 2025-07-28 ENCOUNTER — HOSPITAL ENCOUNTER (OUTPATIENT)
Dept: ORTHOPEDIC SURGERY | Age: 65
Discharge: HOME | End: 2025-07-30
Payer: MEDICARE

## 2025-07-28 VITALS
HEART RATE: 77 BPM | OXYGEN SATURATION: 97 % | HEIGHT: 67 IN | WEIGHT: 145 LBS | TEMPERATURE: 98.1 F | BODY MASS INDEX: 22.76 KG/M2

## 2025-07-28 DIAGNOSIS — Z96.641 S/P HIP REPLACEMENT, RIGHT: ICD-10-CM

## 2025-07-28 DIAGNOSIS — M70.62 GREATER TROCHANTERIC BURSITIS OF LEFT HIP: ICD-10-CM

## 2025-07-28 DIAGNOSIS — Z96.641 S/P HIP REPLACEMENT, RIGHT: Primary | ICD-10-CM

## 2025-07-28 PROCEDURE — 73502 X-RAY EXAM HIP UNI 2-3 VIEWS: CPT

## 2025-07-28 NOTE — PROGRESS NOTES
Subjective:      HPI:: Nora Eisenberg is a 65 y.o. female who presents today for follow-up evaluation of the bilateral hips.  She has had bilateral total hip replacements.  She notes that 1 night she had some groin pain on the left side but this went away.  She does not recall any specific traumatic event or injury.  She does have some lateral hip soreness on the left side.  It is bothersome to the touch but does not bother her with walking.  On the right side she denies pain in the hip itself but has had some soreness that seems muscular over the proximal third thigh.  It does not sound like start up pain.  She denies any significant discomfort currently.  She denies any lateral hip pain on the right side.    Past Medical History:   Diagnosis Date    History of TB (tuberculosis)     5/20/2014     Hyperlipidemia     3/3/2010      Past Surgical History:   Procedure Laterality Date    OVARIAN CYST REMOVAL      2005 : dermoid cyst left     TOTAL HIP ARTHROPLASTY Right 11/4/2024    Right anterior total hip replacement with Todd performed by Stan Clark MD at [facility] OR     Social History     Socioeconomic History    Marital status: Single     Spouse name: Not on file    Number of children: Not on file    Years of education: Not on file    Highest education level: Not on file   Occupational History    Not on file   Tobacco Use    Smoking status: Never    Smokeless tobacco: Not on file   Substance and Sexual Activity    Alcohol use: Yes     Alcohol/week: 0.0 standard drinks of alcohol     Comment: rarely    Drug use: No    Sexual activity: Yes     Partners: Male     Comment: none   Other Topics Concern    Not on file   Social History Narrative    Not on file     Social Drivers of Health     Financial Resource Strain: Low Risk  (11/10/2023)    Received from [facility]    Overall Financial Resource Strain (CARDIA)     Difficulty of Paying Living Expenses: Not hard at all   Food Insecurity: No Food

## (undated) DEVICE — SUTURE, VICRYL, 1, 27 IN, CT-1 CR, UNDYED

## (undated) DEVICE — SUTURE, VICRYL, 2-0, 27 IN, CT-1 CR, UNDYED

## (undated) DEVICE — GLOVE ORANGE PI 7 1/2   MSG9075

## (undated) DEVICE — TOWEL PACK, STERILE, 4/PACK, BLUE

## (undated) DEVICE — DRAPE, SHEET, THREE QUARTER, FAN FOLD, 57 X 77 IN

## (undated) DEVICE — 3M™ STERI-DRAPE™ U-DRAPE 1015: Brand: STERI-DRAPE™

## (undated) DEVICE — GOWN,SIRUS,POLYRNF,BRTHSLV,LG,30/CS: Brand: MEDLINE

## (undated) DEVICE — GLOVE ORTHO 7 1/2   MSG9475

## (undated) DEVICE — SHEET,DRAPE,53X77,STERILE: Brand: MEDLINE

## (undated) DEVICE — C-ARM: Brand: UNBRANDED

## (undated) DEVICE — TOWEL,OR,DSP,ST,BLUE,STD,4/PK,20PK/CS: Brand: MEDLINE

## (undated) DEVICE — BANDAGE, COFLEX, 6 X 5 YDS, FOAM TAN, STERILE, LF

## (undated) DEVICE — INTERPULSE HANDPIECE SET W/ 10FT SUCTION TUBING

## (undated) DEVICE — Device

## (undated) DEVICE — SUTURE, STRATAFIX, SPIRAL MONOCRYL PLUS, 3-0, PS-2 45CM, UNDYED

## (undated) DEVICE — SUTURE VICRYL SZ 2-0 L36IN ABSRB UD L36MM CT-1 1/2 CIR J945H

## (undated) DEVICE — ELECTRODE PT RET AD L9FT HI MOIST COND ADH HYDRGEL CORDED

## (undated) DEVICE — BANDAGE COBAN 6 IN WND 6INX5YD FOAM

## (undated) DEVICE — GOWN, SURGICAL, NON-REINFORCED, XLARGE, LF

## (undated) DEVICE — YANKAUER,SMOOTH HANDLE,HIGH CAPACITY: Brand: MEDLINE INDUSTRIES, INC.

## (undated) DEVICE — PIN BNE FIX TEMP L170MM DIA4MM MAKO

## (undated) DEVICE — SYRINGE MED 50ML LUERLOCK TIP

## (undated) DEVICE — TRACKING KIT, HIP PROCEDURES, VIZADISC

## (undated) DEVICE — HIGH FLOW TIP FOR INTERPULSE HANDPIECE SET

## (undated) DEVICE — KIT DRP FOR RIO ROBOTIC ARM ASST SYS

## (undated) DEVICE — KIT TRK HIP PROC VIZADISC

## (undated) DEVICE — SUTURE STRATAFIX SZ 3-0 30CM NONABSORB UD 26MM FS 3/8 SXMP2B412

## (undated) DEVICE — BLADE SAW SAG 80X21X0.89 MM OFFSET TOOTH FOR LG BNE

## (undated) DEVICE — NEEDLE, SPINAL, QUINCKE, 18 G X 3.5 IN, PINK HUB

## (undated) DEVICE — TOTAL HIP: Brand: MEDLINE INDUSTRIES, INC.

## (undated) DEVICE — LABEL MED MINI W/ MARKER

## (undated) DEVICE — Device: Brand: PROTECTORS, LEG SPAR BALL JOINT, 12/PR

## (undated) DEVICE — SOLUTION, IRRIGATION, SODIUM CHLORIDE 0.9%, 1000 ML, POUR BOTTLE

## (undated) DEVICE — MARKER SURG SKIN GENTIAN VLT REG TIP W/ 6IN RUL DYNJSM01

## (undated) DEVICE — GLOVE, SURGEON, PREMIERPRO PI, MICRO, SZ-7.5, PF, WH

## (undated) DEVICE — ADHESIVE SKIN CLOSURE XL 42 CM 2.7 CC MESH LIQUIBAND SECUR

## (undated) DEVICE — DRESSING, AQUACEL AG, HYDROFIBER W/SILVER, 3.5 X 9.75 IN

## (undated) DEVICE — HOOD, STERISHIELD T4 SYSTEM

## (undated) DEVICE — DRAPE, C-ARM IMAGE

## (undated) DEVICE — PIN, BONE, 4MM X 140MM, STERILE

## (undated) DEVICE — INTERPULSE HIGH FLOW TIP

## (undated) DEVICE — DRAPE KIT, RIO

## (undated) DEVICE — APPLICATOR MEDICATED 26 CC SOLUTION HI LT ORNG CHLORAPREP

## (undated) DEVICE — SYRINGE, 35 CC, LUER LOCK, MONOJECT, W/O CAP, LF

## (undated) DEVICE — GLOVE, SURGICAL, PROTEXIS PI , 7.5, PF, LF

## (undated) DEVICE — SKIN CLOSURE SYS, PREMIERPRO EXOFIN, 1-4CM X 22CM, 1.75G TUBE

## (undated) DEVICE — DRESSING ANTIMIC FOAM MEPILEX BORD POSTOP AG PROD SZ 4X12 IN

## (undated) DEVICE — HOOD: Brand: T7PLUS

## (undated) DEVICE — DRAPE, 136.5 X 84 IN, SPLIT W/ADH, REINFORCED

## (undated) DEVICE — SUTURE VICRYL + SZ 1 L27IN ABSRB UD CT-1 L36MM 1/2 CIR TAPR VCPP40D

## (undated) DEVICE — SOLUTION, IRRIGATION, USP, SODIUM CHLORIDE 0.9%, 3000 ML, BAG

## (undated) DEVICE — DRAPE, INSTRUMENT, W/POUCH, STERI DRAPE, 7 X 11 IN, DISPOSABLE, STERILE